# Patient Record
Sex: FEMALE | Race: WHITE | NOT HISPANIC OR LATINO | URBAN - METROPOLITAN AREA
[De-identification: names, ages, dates, MRNs, and addresses within clinical notes are randomized per-mention and may not be internally consistent; named-entity substitution may affect disease eponyms.]

---

## 2017-12-26 ENCOUNTER — EMERGENCY (EMERGENCY)
Facility: HOSPITAL | Age: 37
LOS: 1 days | Discharge: HOME | End: 2017-12-26
Attending: EMERGENCY MEDICINE
Payer: MEDICAID

## 2017-12-26 VITALS
DIASTOLIC BLOOD PRESSURE: 62 MMHG | RESPIRATION RATE: 18 BRPM | HEART RATE: 81 BPM | OXYGEN SATURATION: 99 % | TEMPERATURE: 98 F | SYSTOLIC BLOOD PRESSURE: 110 MMHG

## 2017-12-26 VITALS
OXYGEN SATURATION: 98 % | TEMPERATURE: 98 F | SYSTOLIC BLOOD PRESSURE: 107 MMHG | WEIGHT: 138.89 LBS | HEART RATE: 84 BPM | RESPIRATION RATE: 18 BRPM | DIASTOLIC BLOOD PRESSURE: 59 MMHG

## 2017-12-26 LAB
ALBUMIN SERPL ELPH-MCNC: 3.7 G/DL — SIGNIFICANT CHANGE UP (ref 3.5–5)
ALP SERPL-CCNC: 43 U/L — SIGNIFICANT CHANGE UP (ref 40–120)
ALT FLD-CCNC: 19 U/L DA — SIGNIFICANT CHANGE UP (ref 10–60)
ANION GAP SERPL CALC-SCNC: 7 MMOL/L — SIGNIFICANT CHANGE UP (ref 5–17)
APPEARANCE UR: CLEAR — SIGNIFICANT CHANGE UP
AST SERPL-CCNC: 10 U/L — SIGNIFICANT CHANGE UP (ref 10–40)
BASOPHILS # BLD AUTO: 0 K/UL — SIGNIFICANT CHANGE UP (ref 0–0.2)
BASOPHILS NFR BLD AUTO: 0.5 % — SIGNIFICANT CHANGE UP (ref 0–2)
BILIRUB SERPL-MCNC: 0.7 MG/DL — SIGNIFICANT CHANGE UP (ref 0.2–1.2)
BILIRUB UR-MCNC: NEGATIVE — SIGNIFICANT CHANGE UP
BUN SERPL-MCNC: 14 MG/DL — SIGNIFICANT CHANGE UP (ref 7–18)
CALCIUM SERPL-MCNC: 8.8 MG/DL — SIGNIFICANT CHANGE UP (ref 8.4–10.5)
CHLORIDE SERPL-SCNC: 108 MMOL/L — SIGNIFICANT CHANGE UP (ref 96–108)
CO2 SERPL-SCNC: 24 MMOL/L — SIGNIFICANT CHANGE UP (ref 22–31)
COLOR SPEC: YELLOW — SIGNIFICANT CHANGE UP
CREAT SERPL-MCNC: 0.46 MG/DL — LOW (ref 0.5–1.3)
DIFF PNL FLD: ABNORMAL
EOSINOPHIL # BLD AUTO: 0 K/UL — SIGNIFICANT CHANGE UP (ref 0–0.5)
EOSINOPHIL NFR BLD AUTO: 0.5 % — SIGNIFICANT CHANGE UP (ref 0–6)
GLUCOSE SERPL-MCNC: 132 MG/DL — HIGH (ref 70–99)
GLUCOSE UR QL: NEGATIVE — SIGNIFICANT CHANGE UP
HCT VFR BLD CALC: 40.8 % — SIGNIFICANT CHANGE UP (ref 34.5–45)
HGB BLD-MCNC: 13.6 G/DL — SIGNIFICANT CHANGE UP (ref 11.5–15.5)
KETONES UR-MCNC: NEGATIVE — SIGNIFICANT CHANGE UP
LEUKOCYTE ESTERASE UR-ACNC: ABNORMAL
LYMPHOCYTES # BLD AUTO: 1 K/UL — SIGNIFICANT CHANGE UP (ref 1–3.3)
LYMPHOCYTES # BLD AUTO: 10.5 % — LOW (ref 13–44)
MCHC RBC-ENTMCNC: 31 PG — SIGNIFICANT CHANGE UP (ref 27–34)
MCHC RBC-ENTMCNC: 33.4 GM/DL — SIGNIFICANT CHANGE UP (ref 32–36)
MCV RBC AUTO: 92.8 FL — SIGNIFICANT CHANGE UP (ref 80–100)
MONOCYTES # BLD AUTO: 0.3 K/UL — SIGNIFICANT CHANGE UP (ref 0–0.9)
MONOCYTES NFR BLD AUTO: 3.6 % — SIGNIFICANT CHANGE UP (ref 2–14)
NEUTROPHILS # BLD AUTO: 7.9 K/UL — HIGH (ref 1.8–7.4)
NEUTROPHILS NFR BLD AUTO: 84.9 % — HIGH (ref 43–77)
NITRITE UR-MCNC: NEGATIVE — SIGNIFICANT CHANGE UP
PH UR: 6 — SIGNIFICANT CHANGE UP (ref 5–8)
PLATELET # BLD AUTO: 187 K/UL — SIGNIFICANT CHANGE UP (ref 150–400)
POTASSIUM SERPL-MCNC: 4.3 MMOL/L — SIGNIFICANT CHANGE UP (ref 3.5–5.3)
POTASSIUM SERPL-SCNC: 4.3 MMOL/L — SIGNIFICANT CHANGE UP (ref 3.5–5.3)
PROT SERPL-MCNC: 7.5 G/DL — SIGNIFICANT CHANGE UP (ref 6–8.3)
PROT UR-MCNC: NEGATIVE — SIGNIFICANT CHANGE UP
RBC # BLD: 4.39 M/UL — SIGNIFICANT CHANGE UP (ref 3.8–5.2)
RBC # FLD: 12 % — SIGNIFICANT CHANGE UP (ref 10.3–14.5)
SODIUM SERPL-SCNC: 139 MMOL/L — SIGNIFICANT CHANGE UP (ref 135–145)
SP GR SPEC: 1.01 — SIGNIFICANT CHANGE UP (ref 1.01–1.02)
UROBILINOGEN FLD QL: NEGATIVE — SIGNIFICANT CHANGE UP
WBC # BLD: 9.3 K/UL — SIGNIFICANT CHANGE UP (ref 3.8–10.5)
WBC # FLD AUTO: 9.3 K/UL — SIGNIFICANT CHANGE UP (ref 3.8–10.5)

## 2017-12-26 PROCEDURE — 76817 TRANSVAGINAL US OBSTETRIC: CPT

## 2017-12-26 PROCEDURE — 84702 CHORIONIC GONADOTROPIN TEST: CPT

## 2017-12-26 PROCEDURE — 81001 URINALYSIS AUTO W/SCOPE: CPT

## 2017-12-26 PROCEDURE — 96375 TX/PRO/DX INJ NEW DRUG ADDON: CPT

## 2017-12-26 PROCEDURE — 76817 TRANSVAGINAL US OBSTETRIC: CPT | Mod: 26

## 2017-12-26 PROCEDURE — 85027 COMPLETE CBC AUTOMATED: CPT

## 2017-12-26 PROCEDURE — 96365 THER/PROPH/DIAG IV INF INIT: CPT

## 2017-12-26 PROCEDURE — 99284 EMERGENCY DEPT VISIT MOD MDM: CPT

## 2017-12-26 PROCEDURE — 80053 COMPREHEN METABOLIC PANEL: CPT

## 2017-12-26 PROCEDURE — 76801 OB US < 14 WKS SINGLE FETUS: CPT | Mod: 26

## 2017-12-26 PROCEDURE — 76801 OB US < 14 WKS SINGLE FETUS: CPT

## 2017-12-26 PROCEDURE — 99284 EMERGENCY DEPT VISIT MOD MDM: CPT | Mod: 25

## 2017-12-26 RX ORDER — ACETAMINOPHEN 500 MG
1000 TABLET ORAL ONCE
Qty: 0 | Refills: 0 | Status: COMPLETED | OUTPATIENT
Start: 2017-12-26 | End: 2017-12-26

## 2017-12-26 RX ORDER — SODIUM CHLORIDE 9 MG/ML
1000 INJECTION INTRAMUSCULAR; INTRAVENOUS; SUBCUTANEOUS ONCE
Qty: 0 | Refills: 0 | Status: COMPLETED | OUTPATIENT
Start: 2017-12-26 | End: 2017-12-26

## 2017-12-26 RX ORDER — ONDANSETRON 8 MG/1
1 TABLET, FILM COATED ORAL
Qty: 15 | Refills: 0 | OUTPATIENT
Start: 2017-12-26 | End: 2017-12-30

## 2017-12-26 RX ORDER — DOCUSATE SODIUM 100 MG
1 CAPSULE ORAL
Qty: 20 | Refills: 0 | OUTPATIENT
Start: 2017-12-26 | End: 2018-01-04

## 2017-12-26 RX ORDER — POLYETHYLENE GLYCOL 3350 17 G/17G
17 POWDER, FOR SOLUTION ORAL ONCE
Qty: 0 | Refills: 0 | Status: COMPLETED | OUTPATIENT
Start: 2017-12-26 | End: 2017-12-26

## 2017-12-26 RX ORDER — ONDANSETRON 8 MG/1
4 TABLET, FILM COATED ORAL ONCE
Qty: 0 | Refills: 0 | Status: COMPLETED | OUTPATIENT
Start: 2017-12-26 | End: 2017-12-26

## 2017-12-26 RX ADMIN — Medication 1 ENEMA: at 20:17

## 2017-12-26 RX ADMIN — POLYETHYLENE GLYCOL 3350 17 GRAM(S): 17 POWDER, FOR SOLUTION ORAL at 18:23

## 2017-12-26 RX ADMIN — ONDANSETRON 4 MILLIGRAM(S): 8 TABLET, FILM COATED ORAL at 16:57

## 2017-12-26 RX ADMIN — SODIUM CHLORIDE 1000 MILLILITER(S): 9 INJECTION INTRAMUSCULAR; INTRAVENOUS; SUBCUTANEOUS at 16:57

## 2017-12-26 RX ADMIN — SODIUM CHLORIDE 1000 MILLILITER(S): 9 INJECTION INTRAMUSCULAR; INTRAVENOUS; SUBCUTANEOUS at 18:23

## 2017-12-26 RX ADMIN — Medication 400 MILLIGRAM(S): at 17:01

## 2017-12-26 RX ADMIN — Medication 1000 MILLIGRAM(S): at 20:15

## 2017-12-26 NOTE — ED PROVIDER NOTE - CHPI ED SYMPTOMS NEG
no fever/no chills/no chest pain, no difficulty breathing, no vaginal bleeding, no vaginal discharge/no dysuria

## 2017-12-26 NOTE — ED PROVIDER NOTE - PHYSICAL EXAMINATION
RECTAL: After initial rectal exam, when enema was pulled out, pt experienced pain and refused further manual disimpaction, stating she prefers oral laxatives.  GENITOURINARY: Bedside sonogram reveals intrauterine gestation with heart rate of 185.

## 2017-12-26 NOTE — ED PROVIDER NOTE - MEDICAL DECISION MAKING DETAILS
Rectal pain and tenderness in the setting of known constipation and decreased PO intake secondary to dehydration and pregnancy. Will r/o ruptured ovarian cyst versus ectopic pregnancy with bedside US, give Miralax and fluids for symptomatic treatment, and reassess. Rectal pain and tenderness in the setting of known constipation and decreased PO intake secondary to dehydration and pregnancy. Will r/o ruptured ovarian cyst versus ectopic pregnancy w/transvag US. give Miralax and fluids, zofran, tylenol for symptom relief. Rectal pain and tenderness in the setting of known constipation and decreased PO intake secondary to dehydration and pregnancy. Will r/o ruptured ovarian cyst versus ectopic pregnancy w/transvag US. give Miralax and fluids, zofran, tylenol for symptom relief.    Attending: agree with above

## 2017-12-26 NOTE — ED PROVIDER NOTE - CARE PLAN
Instructions for follow-up, activity and diet:	The patient was given verbal and written discharge instructions. Specifically, instructions when to return to the ED and when to seek follow-up from their pcp was discussed. Any specialty follow-up was discussed, including how to make an appointment.  Instructions were discussed in simple, plain language and was understood by the patient. The patient understands that should their symptoms worsen or any new symptoms arise, they should return to the ED immediately for further evaluation. Principal Discharge DX:	Constipation  Instructions for follow-up, activity and diet:	The patient was given verbal and written discharge instructions. Specifically, instructions when to return to the ED and when to seek follow-up from their pcp was discussed. Any specialty follow-up was discussed, including how to make an appointment.  Instructions were discussed in simple, plain language and was understood by the patient. The patient understands that should their symptoms worsen or any new symptoms arise, they should return to the ED immediately for further evaluation.

## 2017-12-26 NOTE — ED PROVIDER NOTE - OBJECTIVE STATEMENT
35 y/o F pt (; currently x11 weeks pregnant with an US confirmed IUP) with no PMHx and no PSHx presents to ED c/o progressively worsening diffuse abd pain with associated nausea and constipation (no BM) x1 week. Pt also reports difficulty tolerating PO secondary to nausea and abd pain. Pt states she hasn't been drinking any water for several weeks because it has been causing "stomach discomfort". Pt reports taking multiple enemas with no relief of constipation. Per pt, pt has only been taking folic acid with her pregnancy, and has not taken any iron supplements. Pt denies fever, chills, dysuria, CP, difficulty breathing, vaginal bleeding, vaginal discharge, or any other complaints. Pt also denies pregnancy being medically induced. NKDA. 35 y/o F pt (; currently x11 weeks pregnant with an US confirmed IUP) presents to ED c/o progressively worsening diffuse abd pain with associated nausea and constipation (no BM) x1 week. Pt also reports difficulty tolerating PO secondary to nausea and abd pain. Pt states she hasn't been drinking enough water for several weeks because it has been causing stomach pain. Pt reports taking multiple enemas with no relief of constipation. Per pt, pt has only been taking folic acid with her pregnancy, and has not taken any iron supplements. Pt denies fever, chills, dysuria, CP, difficulty breathing, vaginal bleeding, vaginal discharge, or any other complaints. Pt also denies pregnancy being medically induced. NKDA. 37 y/o F pt (; currently x11 weeks pregnant with an US confirmed IUP) presents to ED c/o progressively worsening diffuse abd pain with associated nausea and constipation (no BM) x1 week. Pt also reports difficulty tolerating PO secondary to nausea and abd pain. Pt states she hasn't been drinking enough water for several weeks because it has been causing stomach pain. Pt reports taking multiple enemas with no relief of constipation. Per pt, pt has only been taking folic acid with her pregnancy, and has not taken any iron supplements. Pt denies fever, chills, dysuria, CP, difficulty breathing, vaginal bleeding, vaginal discharge, or any other complaints. Pt also denies pregnancy being medically induced. NKDA.    Attending: as above, in addition patient tried enema but only used 1/4 bottle at a time as she was unsure of how to perform enema with no relief.

## 2017-12-26 NOTE — ED PROVIDER NOTE - ATTENDING CONTRIBUTION TO CARE
Dr. Smallwood: I have personally performed a face to face bedside history and physical examination of this patient. I have discussed the history, examination, review of systems, assessment and plan of management with the resident. I have reviewed the electronic medical record and amended it to reflect my history, review of systems, physical exam, assessment and plan.    Attending Exam - Dr. Smallwood: GEN: well appearing, NAD  HEENT: +PERRL, EOMI  RESP: CTAB, no signs of respiratory distress CV: s1s2 RRR ABD: soft/non tender/non distended  MSK: no deformities / swelling, normal range of motion, spine grossly normal NEURO: alert, non focal exam SKIN: normal color / temperature / condition.

## 2017-12-26 NOTE — ED PROVIDER NOTE - MUSCULOSKELETAL, MLM
Spine appears normal, range of motion is not limited, no muscle or joint tenderness. Extremities warm and well-perfused.

## 2018-07-04 ENCOUNTER — OUTPATIENT (OUTPATIENT)
Dept: OUTPATIENT SERVICES | Facility: HOSPITAL | Age: 38
LOS: 1 days | End: 2018-07-04

## 2018-07-04 ENCOUNTER — EMERGENCY (EMERGENCY)
Facility: HOSPITAL | Age: 38
LOS: 1 days | Discharge: STILL ADMITTED OR ADMINISTRATIVE | End: 2018-07-04
Admitting: EMERGENCY MEDICINE

## 2018-07-04 VITALS
HEART RATE: 78 BPM | SYSTOLIC BLOOD PRESSURE: 110 MMHG | DIASTOLIC BLOOD PRESSURE: 58 MMHG | OXYGEN SATURATION: 100 % | RESPIRATION RATE: 18 BRPM | TEMPERATURE: 98 F

## 2018-07-04 DIAGNOSIS — O26.899 OTHER SPECIFIED PREGNANCY RELATED CONDITIONS, UNSPECIFIED TRIMESTER: ICD-10-CM

## 2018-07-04 DIAGNOSIS — Z3A.00 WEEKS OF GESTATION OF PREGNANCY NOT SPECIFIED: ICD-10-CM

## 2018-07-04 LAB
BASOPHILS # BLD AUTO: 0.03 K/UL — SIGNIFICANT CHANGE UP (ref 0–0.2)
BASOPHILS NFR BLD AUTO: 0.5 % — SIGNIFICANT CHANGE UP (ref 0–2)
BLD GP AB SCN SERPL QL: NEGATIVE — SIGNIFICANT CHANGE UP
EOSINOPHIL # BLD AUTO: 0.07 K/UL — SIGNIFICANT CHANGE UP (ref 0–0.5)
EOSINOPHIL NFR BLD AUTO: 1.3 % — SIGNIFICANT CHANGE UP (ref 0–6)
HCT VFR BLD CALC: 34.7 % — SIGNIFICANT CHANGE UP (ref 34.5–45)
HGB BLD-MCNC: 11 G/DL — LOW (ref 11.5–15.5)
IMM GRANULOCYTES # BLD AUTO: 0.02 # — SIGNIFICANT CHANGE UP
IMM GRANULOCYTES NFR BLD AUTO: 0.4 % — SIGNIFICANT CHANGE UP (ref 0–1.5)
LYMPHOCYTES # BLD AUTO: 1.07 K/UL — SIGNIFICANT CHANGE UP (ref 1–3.3)
LYMPHOCYTES # BLD AUTO: 19.4 % — SIGNIFICANT CHANGE UP (ref 13–44)
MCHC RBC-ENTMCNC: 28.1 PG — SIGNIFICANT CHANGE UP (ref 27–34)
MCHC RBC-ENTMCNC: 31.7 % — LOW (ref 32–36)
MCV RBC AUTO: 88.7 FL — SIGNIFICANT CHANGE UP (ref 80–100)
MONOCYTES # BLD AUTO: 0.33 K/UL — SIGNIFICANT CHANGE UP (ref 0–0.9)
MONOCYTES NFR BLD AUTO: 6 % — SIGNIFICANT CHANGE UP (ref 2–14)
NEUTROPHILS # BLD AUTO: 4 K/UL — SIGNIFICANT CHANGE UP (ref 1.8–7.4)
NEUTROPHILS NFR BLD AUTO: 72.4 % — SIGNIFICANT CHANGE UP (ref 43–77)
NRBC # FLD: 0 — SIGNIFICANT CHANGE UP
PLATELET # BLD AUTO: 167 K/UL — SIGNIFICANT CHANGE UP (ref 150–400)
PMV BLD: 11.2 FL — SIGNIFICANT CHANGE UP (ref 7–13)
RBC # BLD: 3.91 M/UL — SIGNIFICANT CHANGE UP (ref 3.8–5.2)
RBC # FLD: 13.4 % — SIGNIFICANT CHANGE UP (ref 10.3–14.5)
RH IG SCN BLD-IMP: POSITIVE — SIGNIFICANT CHANGE UP
WBC # BLD: 5.52 K/UL — SIGNIFICANT CHANGE UP (ref 3.8–10.5)
WBC # FLD AUTO: 5.52 K/UL — SIGNIFICANT CHANGE UP (ref 3.8–10.5)

## 2018-07-04 RX ORDER — SODIUM CHLORIDE 9 MG/ML
1000 INJECTION, SOLUTION INTRAVENOUS ONCE
Qty: 0 | Refills: 0 | Status: DISCONTINUED | OUTPATIENT
Start: 2018-07-04 | End: 2018-07-19

## 2018-07-04 RX ORDER — SODIUM CHLORIDE 9 MG/ML
1000 INJECTION, SOLUTION INTRAVENOUS
Qty: 0 | Refills: 0 | Status: DISCONTINUED | OUTPATIENT
Start: 2018-07-04 | End: 2018-07-19

## 2018-07-04 NOTE — ED ADULT TRIAGE NOTE - CHIEF COMPLAINT QUOTE
pt states she is due July 20th, Is having lower abdominal pain that last 2 hours at a time. does not feel like contractions, denies bloody show or water breaking.

## 2018-07-05 LAB — T PALLIDUM AB TITR SER: NEGATIVE — SIGNIFICANT CHANGE UP

## 2018-07-11 ENCOUNTER — OUTPATIENT (OUTPATIENT)
Dept: OUTPATIENT SERVICES | Facility: HOSPITAL | Age: 38
LOS: 1 days | End: 2018-07-11
Payer: MEDICAID

## 2018-07-11 DIAGNOSIS — Z3A.00 WEEKS OF GESTATION OF PREGNANCY NOT SPECIFIED: ICD-10-CM

## 2018-07-11 DIAGNOSIS — O26.899 OTHER SPECIFIED PREGNANCY RELATED CONDITIONS, UNSPECIFIED TRIMESTER: ICD-10-CM

## 2018-07-11 PROCEDURE — 76819 FETAL BIOPHYS PROFIL W/O NST: CPT

## 2018-07-11 PROCEDURE — 76815 OB US LIMITED FETUS(S): CPT | Mod: 26

## 2018-07-11 PROCEDURE — 99283 EMERGENCY DEPT VISIT LOW MDM: CPT

## 2018-07-11 PROCEDURE — 76819 FETAL BIOPHYS PROFIL W/O NST: CPT | Mod: 26

## 2018-07-11 PROCEDURE — 59025 FETAL NON-STRESS TEST: CPT

## 2018-07-11 PROCEDURE — 76815 OB US LIMITED FETUS(S): CPT

## 2018-07-11 PROCEDURE — G0463: CPT

## 2018-07-26 ENCOUNTER — INPATIENT (INPATIENT)
Facility: HOSPITAL | Age: 38
LOS: 1 days | Discharge: HOME | End: 2018-07-28
Attending: OBSTETRICS & GYNECOLOGY | Admitting: OBSTETRICS & GYNECOLOGY
Payer: MEDICAID

## 2018-07-26 VITALS — WEIGHT: 174.17 LBS

## 2018-07-26 DIAGNOSIS — Z3A.00 WEEKS OF GESTATION OF PREGNANCY NOT SPECIFIED: ICD-10-CM

## 2018-07-26 DIAGNOSIS — O26.899 OTHER SPECIFIED PREGNANCY RELATED CONDITIONS, UNSPECIFIED TRIMESTER: ICD-10-CM

## 2018-07-26 DIAGNOSIS — Z34.80 ENCOUNTER FOR SUPERVISION OF OTHER NORMAL PREGNANCY, UNSPECIFIED TRIMESTER: ICD-10-CM

## 2018-07-26 LAB
BASOPHILS # BLD AUTO: 0 K/UL — SIGNIFICANT CHANGE UP (ref 0–0.2)
BASOPHILS NFR BLD AUTO: 0.4 % — SIGNIFICANT CHANGE UP (ref 0–2)
BLD GP AB SCN SERPL QL: NEGATIVE — SIGNIFICANT CHANGE UP
EOSINOPHIL # BLD AUTO: 0.1 K/UL — SIGNIFICANT CHANGE UP (ref 0–0.5)
EOSINOPHIL NFR BLD AUTO: 2.3 % — SIGNIFICANT CHANGE UP (ref 0–6)
HBV SURFACE AG SERPL QL IA: SIGNIFICANT CHANGE UP
HCT VFR BLD CALC: 39.8 % — SIGNIFICANT CHANGE UP (ref 34.5–45)
HGB BLD-MCNC: 12.3 G/DL — SIGNIFICANT CHANGE UP (ref 11.5–15.5)
HIV 1 & 2 AB SERPL IA.RAPID: SIGNIFICANT CHANGE UP
LYMPHOCYTES # BLD AUTO: 1.2 K/UL — SIGNIFICANT CHANGE UP (ref 1–3.3)
LYMPHOCYTES # BLD AUTO: 21.3 % — SIGNIFICANT CHANGE UP (ref 13–44)
MCHC RBC-ENTMCNC: 26.9 PG — LOW (ref 27–34)
MCHC RBC-ENTMCNC: 30.8 GM/DL — LOW (ref 32–36)
MCV RBC AUTO: 87.2 FL — SIGNIFICANT CHANGE UP (ref 80–100)
MONOCYTES # BLD AUTO: 0.3 K/UL — SIGNIFICANT CHANGE UP (ref 0–0.9)
MONOCYTES NFR BLD AUTO: 5.8 % — SIGNIFICANT CHANGE UP (ref 2–14)
NEUTROPHILS # BLD AUTO: 3.9 K/UL — SIGNIFICANT CHANGE UP (ref 1.8–7.4)
NEUTROPHILS NFR BLD AUTO: 70.2 % — SIGNIFICANT CHANGE UP (ref 43–77)
PLATELET # BLD AUTO: 156 K/UL — SIGNIFICANT CHANGE UP (ref 150–400)
RBC # BLD: 4.57 M/UL — SIGNIFICANT CHANGE UP (ref 3.8–5.2)
RBC # FLD: 14.2 % — SIGNIFICANT CHANGE UP (ref 10.3–14.5)
RH IG SCN BLD-IMP: POSITIVE — SIGNIFICANT CHANGE UP
RUBV IGG SER-ACNC: 4.1 INDEX — SIGNIFICANT CHANGE UP
RUBV IGG SER-IMP: POSITIVE — SIGNIFICANT CHANGE UP
T PALLIDUM AB TITR SER: NEGATIVE — SIGNIFICANT CHANGE UP
WBC # BLD: 5.6 K/UL — SIGNIFICANT CHANGE UP (ref 3.8–10.5)
WBC # FLD AUTO: 5.6 K/UL — SIGNIFICANT CHANGE UP (ref 3.8–10.5)

## 2018-07-26 PROCEDURE — 59409 OBSTETRICAL CARE: CPT | Mod: U9

## 2018-07-26 RX ORDER — DIPHENOXYLATE HCL/ATROPINE 2.5-.025MG
1 TABLET ORAL ONCE
Qty: 0 | Refills: 0 | Status: DISCONTINUED | OUTPATIENT
Start: 2018-07-26 | End: 2018-07-26

## 2018-07-26 RX ORDER — AER TRAVELER 0.5 G/1
1 SOLUTION RECTAL; TOPICAL EVERY 4 HOURS
Qty: 0 | Refills: 0 | Status: DISCONTINUED | OUTPATIENT
Start: 2018-07-26 | End: 2018-07-26

## 2018-07-26 RX ORDER — DIBUCAINE 1 %
1 OINTMENT (GRAM) RECTAL EVERY 4 HOURS
Qty: 0 | Refills: 0 | Status: DISCONTINUED | OUTPATIENT
Start: 2018-07-27 | End: 2018-07-28

## 2018-07-26 RX ORDER — AER TRAVELER 0.5 G/1
1 SOLUTION RECTAL; TOPICAL EVERY 4 HOURS
Qty: 0 | Refills: 0 | Status: DISCONTINUED | OUTPATIENT
Start: 2018-07-27 | End: 2018-07-28

## 2018-07-26 RX ORDER — OXYTOCIN 10 UNIT/ML
333.33 VIAL (ML) INJECTION
Qty: 20 | Refills: 0 | Status: COMPLETED | OUTPATIENT
Start: 2018-07-26

## 2018-07-26 RX ORDER — SODIUM CHLORIDE 9 MG/ML
1000 INJECTION, SOLUTION INTRAVENOUS ONCE
Qty: 0 | Refills: 0 | Status: DISCONTINUED | OUTPATIENT
Start: 2018-07-26 | End: 2018-07-26

## 2018-07-26 RX ORDER — DIPHENHYDRAMINE HCL 50 MG
25 CAPSULE ORAL EVERY 6 HOURS
Qty: 0 | Refills: 0 | Status: DISCONTINUED | OUTPATIENT
Start: 2018-07-27 | End: 2018-07-28

## 2018-07-26 RX ORDER — ONDANSETRON 8 MG/1
4 TABLET, FILM COATED ORAL ONCE
Qty: 0 | Refills: 0 | Status: DISCONTINUED | OUTPATIENT
Start: 2018-07-26 | End: 2018-07-28

## 2018-07-26 RX ORDER — DOCUSATE SODIUM 100 MG
100 CAPSULE ORAL
Qty: 0 | Refills: 0 | Status: DISCONTINUED | OUTPATIENT
Start: 2018-07-27 | End: 2018-07-28

## 2018-07-26 RX ORDER — PRAMOXINE HYDROCHLORIDE 150 MG/15G
1 AEROSOL, FOAM RECTAL EVERY 4 HOURS
Qty: 0 | Refills: 0 | Status: DISCONTINUED | OUTPATIENT
Start: 2018-07-27 | End: 2018-07-28

## 2018-07-26 RX ORDER — CARBOPROST TROMETHAMINE 250 UG/ML
250 INJECTION, SOLUTION INTRAMUSCULAR ONCE
Qty: 0 | Refills: 0 | Status: COMPLETED | OUTPATIENT
Start: 2018-07-26 | End: 2018-07-26

## 2018-07-26 RX ORDER — HYDROCORTISONE 1 %
1 OINTMENT (GRAM) TOPICAL EVERY 4 HOURS
Qty: 0 | Refills: 0 | Status: DISCONTINUED | OUTPATIENT
Start: 2018-07-27 | End: 2018-07-28

## 2018-07-26 RX ORDER — SODIUM CHLORIDE 9 MG/ML
3 INJECTION INTRAMUSCULAR; INTRAVENOUS; SUBCUTANEOUS EVERY 8 HOURS
Qty: 0 | Refills: 0 | Status: DISCONTINUED | OUTPATIENT
Start: 2018-07-26 | End: 2018-07-26

## 2018-07-26 RX ORDER — OXYTOCIN 10 UNIT/ML
333.33 VIAL (ML) INJECTION
Qty: 20 | Refills: 0 | Status: COMPLETED | OUTPATIENT
Start: 2018-07-26 | End: 2018-07-26

## 2018-07-26 RX ORDER — HYDROCORTISONE 1 %
1 OINTMENT (GRAM) TOPICAL EVERY 4 HOURS
Qty: 0 | Refills: 0 | Status: DISCONTINUED | OUTPATIENT
Start: 2018-07-26 | End: 2018-07-26

## 2018-07-26 RX ORDER — LANOLIN
1 OINTMENT (GRAM) TOPICAL EVERY 6 HOURS
Qty: 0 | Refills: 0 | Status: DISCONTINUED | OUTPATIENT
Start: 2018-07-27 | End: 2018-07-28

## 2018-07-26 RX ORDER — PRAMOXINE HYDROCHLORIDE 150 MG/15G
1 AEROSOL, FOAM RECTAL EVERY 4 HOURS
Qty: 0 | Refills: 0 | Status: DISCONTINUED | OUTPATIENT
Start: 2018-07-26 | End: 2018-07-26

## 2018-07-26 RX ORDER — SODIUM CHLORIDE 9 MG/ML
1000 INJECTION, SOLUTION INTRAVENOUS
Qty: 0 | Refills: 0 | Status: DISCONTINUED | OUTPATIENT
Start: 2018-07-26 | End: 2018-07-26

## 2018-07-26 RX ORDER — GLYCERIN ADULT
1 SUPPOSITORY, RECTAL RECTAL AT BEDTIME
Qty: 0 | Refills: 0 | Status: DISCONTINUED | OUTPATIENT
Start: 2018-07-27 | End: 2018-07-28

## 2018-07-26 RX ORDER — MAGNESIUM HYDROXIDE 400 MG/1
30 TABLET, CHEWABLE ORAL
Qty: 0 | Refills: 0 | Status: DISCONTINUED | OUTPATIENT
Start: 2018-07-27 | End: 2018-07-28

## 2018-07-26 RX ORDER — SIMETHICONE 80 MG/1
80 TABLET, CHEWABLE ORAL EVERY 6 HOURS
Qty: 0 | Refills: 0 | Status: DISCONTINUED | OUTPATIENT
Start: 2018-07-27 | End: 2018-07-28

## 2018-07-26 RX ORDER — SODIUM CHLORIDE 9 MG/ML
1000 INJECTION, SOLUTION INTRAVENOUS ONCE
Qty: 0 | Refills: 0 | Status: DISCONTINUED | OUTPATIENT
Start: 2018-07-26 | End: 2018-07-28

## 2018-07-26 RX ORDER — DIBUCAINE 1 %
1 OINTMENT (GRAM) RECTAL EVERY 4 HOURS
Qty: 0 | Refills: 0 | Status: DISCONTINUED | OUTPATIENT
Start: 2018-07-26 | End: 2018-07-26

## 2018-07-26 RX ORDER — CITRIC ACID/SODIUM CITRATE 300-500 MG
15 SOLUTION, ORAL ORAL EVERY 4 HOURS
Qty: 0 | Refills: 0 | Status: DISCONTINUED | OUTPATIENT
Start: 2018-07-26 | End: 2018-07-26

## 2018-07-26 RX ORDER — OXYTOCIN 10 UNIT/ML
41.67 VIAL (ML) INJECTION
Qty: 20 | Refills: 0 | Status: DISCONTINUED | OUTPATIENT
Start: 2018-07-26 | End: 2018-07-26

## 2018-07-26 RX ORDER — TETANUS TOXOID, REDUCED DIPHTHERIA TOXOID AND ACELLULAR PERTUSSIS VACCINE, ADSORBED 5; 2.5; 8; 8; 2.5 [IU]/.5ML; [IU]/.5ML; UG/.5ML; UG/.5ML; UG/.5ML
0.5 SUSPENSION INTRAMUSCULAR ONCE
Qty: 0 | Refills: 0 | Status: COMPLETED | OUTPATIENT
Start: 2018-07-28

## 2018-07-26 RX ORDER — OXYTOCIN 10 UNIT/ML
4 VIAL (ML) INJECTION
Qty: 30 | Refills: 0 | Status: DISCONTINUED | OUTPATIENT
Start: 2018-07-26 | End: 2018-07-27

## 2018-07-26 RX ADMIN — Medication 1 TABLET(S): at 20:46

## 2018-07-26 RX ADMIN — Medication 1000 MILLIUNIT(S)/MIN: at 19:35

## 2018-07-26 RX ADMIN — CARBOPROST TROMETHAMINE 250 MICROGRAM(S): 250 INJECTION, SOLUTION INTRAMUSCULAR at 19:59

## 2018-07-26 NOTE — CHART NOTE - NSCHARTNOTEFT_GEN_A_CORE
Patient is 37y  s/p  with PPH 600cc, received hemabate/cytotec, called to bathroom in PACU for pt vomiting and feeling unwell. Pt states that she feels weak with copious diarrhea and emesis. Pt pale appearing and diaphoretic.       VS on exam   HR  /80    Vital Signs Last 24 Hrs  T(C): 36.9 (2018 20:25), Max: 36.9 (2018 20:25)  T(F): 98.4 (2018 20:25), Max: 98.4 (2018 20:25)  HR: 64 (2018 22:40) (64 - 76)  BP: 104/57 (2018 22:40) (103/55 - 122/63)  BP(mean): 77 (2018 22:40) (72 - 85)  RR: 18 (2018 21:25) (16 - 18)  SpO2: 99% (2018 22:40) (96% - 100%)    I&O's Detail    2018 07:01  -  2018 23:34  --------------------------------------------------------  IN:    Lactated Ringers IV Bolus: 1000 mL    lactated ringers.: 560.4 mL    oxytocin Infusion: 4.4 mL  Total IN: 1564.7 mL    OUT:    Estimated Blood Loss: 600 mL    Indwelling Catheter - Urethral: 50 mL  Total OUT: 650 mL    Total NET: 914.7 mL          PE:  Gen: pale, diaphoretic  CV: RR s1s2  Abd: fundus firm   Ext: NT b/l  VE: no bleeding     Labs:                        12.3   5.6   )-----------( 156      ( 2018 08:55 )             39.8           Fibrinogen:     Lactate:     MEDICATIONS  (STANDING):  misoprostol 25 MICROGram(s) Vaginal once  ondansetron Injectable 4 milliGRAM(s) IV Push once  oxytocin Infusion 4 milliUNIT(s)/Min (4 mL/Hr) IV Continuous <Continuous>      Assessment: 38 yo s/p vasovagal episode in PACU, known PPH, not currently bleeding     Differential Dx : Likely vasovagal episode, less likely anemia, infection, electrolyte abnormalities    Plan:  VS WNL   Will send CBC CMP Lactate  IVF bolus  Bedrest for now   Continue to monitor VS closely    Lscanlon R3             -------------------------------------------------------------------------------------------------------------

## 2018-07-27 ENCOUNTER — TRANSCRIPTION ENCOUNTER (OUTPATIENT)
Age: 38
End: 2018-07-27

## 2018-07-27 LAB
ALBUMIN SERPL ELPH-MCNC: 3.4 G/DL — SIGNIFICANT CHANGE UP (ref 3.3–5)
ALP SERPL-CCNC: 201 U/L — HIGH (ref 40–120)
ALT FLD-CCNC: 17 U/L — SIGNIFICANT CHANGE UP (ref 10–45)
ANION GAP SERPL CALC-SCNC: 16 MMOL/L — SIGNIFICANT CHANGE UP (ref 5–17)
APTT BLD: 23.2 SEC — LOW (ref 27.5–37.4)
AST SERPL-CCNC: 24 U/L — SIGNIFICANT CHANGE UP (ref 10–40)
BASOPHILS # BLD AUTO: 0 K/UL — SIGNIFICANT CHANGE UP (ref 0–0.2)
BASOPHILS NFR BLD AUTO: 0.2 % — SIGNIFICANT CHANGE UP (ref 0–2)
BILIRUB SERPL-MCNC: 1 MG/DL — SIGNIFICANT CHANGE UP (ref 0.2–1.2)
BUN SERPL-MCNC: 9 MG/DL — SIGNIFICANT CHANGE UP (ref 7–23)
CALCIUM SERPL-MCNC: 9 MG/DL — SIGNIFICANT CHANGE UP (ref 8.4–10.5)
CHLORIDE SERPL-SCNC: 106 MMOL/L — SIGNIFICANT CHANGE UP (ref 96–108)
CO2 SERPL-SCNC: 18 MMOL/L — LOW (ref 22–31)
CREAT SERPL-MCNC: 0.44 MG/DL — LOW (ref 0.5–1.3)
EOSINOPHIL # BLD AUTO: 0 K/UL — SIGNIFICANT CHANGE UP (ref 0–0.5)
EOSINOPHIL NFR BLD AUTO: 0.3 % — SIGNIFICANT CHANGE UP (ref 0–6)
GLUCOSE SERPL-MCNC: 103 MG/DL — HIGH (ref 70–99)
HCT VFR BLD CALC: 30.6 % — LOW (ref 34.5–45)
HCT VFR BLD CALC: 37.3 % — SIGNIFICANT CHANGE UP (ref 34.5–45)
HGB BLD-MCNC: 10.2 G/DL — LOW (ref 11.5–15.5)
HGB BLD-MCNC: 12.5 G/DL — SIGNIFICANT CHANGE UP (ref 11.5–15.5)
INR BLD: 0.89 RATIO — SIGNIFICANT CHANGE UP (ref 0.88–1.16)
LACTATE SERPL-SCNC: 2.1 MMOL/L — HIGH (ref 0.7–2)
LACTATE SERPL-SCNC: 5.2 MMOL/L — CRITICAL HIGH (ref 0.7–2)
LYMPHOCYTES # BLD AUTO: 1 K/UL — SIGNIFICANT CHANGE UP (ref 1–3.3)
LYMPHOCYTES # BLD AUTO: 11.2 % — LOW (ref 13–44)
MCHC RBC-ENTMCNC: 28.9 PG — SIGNIFICANT CHANGE UP (ref 27–34)
MCHC RBC-ENTMCNC: 29.4 PG — SIGNIFICANT CHANGE UP (ref 27–34)
MCHC RBC-ENTMCNC: 33.3 GM/DL — SIGNIFICANT CHANGE UP (ref 32–36)
MCHC RBC-ENTMCNC: 33.6 GM/DL — SIGNIFICANT CHANGE UP (ref 32–36)
MCV RBC AUTO: 86.6 FL — SIGNIFICANT CHANGE UP (ref 80–100)
MCV RBC AUTO: 87.5 FL — SIGNIFICANT CHANGE UP (ref 80–100)
MONOCYTES # BLD AUTO: 0.4 K/UL — SIGNIFICANT CHANGE UP (ref 0–0.9)
MONOCYTES NFR BLD AUTO: 3.8 % — SIGNIFICANT CHANGE UP (ref 2–14)
NEUTROPHILS # BLD AUTO: 8 K/UL — HIGH (ref 1.8–7.4)
NEUTROPHILS NFR BLD AUTO: 84.5 % — HIGH (ref 43–77)
PLATELET # BLD AUTO: 148 K/UL — LOW (ref 150–400)
PLATELET # BLD AUTO: 205 K/UL — SIGNIFICANT CHANGE UP (ref 150–400)
POTASSIUM SERPL-MCNC: 3.7 MMOL/L — SIGNIFICANT CHANGE UP (ref 3.5–5.3)
POTASSIUM SERPL-SCNC: 3.7 MMOL/L — SIGNIFICANT CHANGE UP (ref 3.5–5.3)
PROT SERPL-MCNC: 6.3 G/DL — SIGNIFICANT CHANGE UP (ref 6–8.3)
PROTHROM AB SERPL-ACNC: 9.7 SEC — LOW (ref 9.8–12.7)
RBC # BLD: 3.53 M/UL — LOW (ref 3.8–5.2)
RBC # BLD: 4.26 M/UL — SIGNIFICANT CHANGE UP (ref 3.8–5.2)
RBC # FLD: 13.8 % — SIGNIFICANT CHANGE UP (ref 10.3–14.5)
RBC # FLD: 13.9 % — SIGNIFICANT CHANGE UP (ref 10.3–14.5)
SODIUM SERPL-SCNC: 140 MMOL/L — SIGNIFICANT CHANGE UP (ref 135–145)
WBC # BLD: 16.8 K/UL — HIGH (ref 3.8–10.5)
WBC # BLD: 9.4 K/UL — SIGNIFICANT CHANGE UP (ref 3.8–10.5)
WBC # FLD AUTO: 16.8 K/UL — HIGH (ref 3.8–10.5)
WBC # FLD AUTO: 9.4 K/UL — SIGNIFICANT CHANGE UP (ref 3.8–10.5)

## 2018-07-27 RX ORDER — KETOROLAC TROMETHAMINE 30 MG/ML
30 SYRINGE (ML) INJECTION ONCE
Qty: 0 | Refills: 0 | Status: DISCONTINUED | OUTPATIENT
Start: 2018-07-27 | End: 2018-07-28

## 2018-07-27 RX ORDER — OXYCODONE HYDROCHLORIDE 5 MG/1
5 TABLET ORAL EVERY 4 HOURS
Qty: 0 | Refills: 0 | Status: DISCONTINUED | OUTPATIENT
Start: 2018-07-27 | End: 2018-07-28

## 2018-07-27 RX ORDER — DOCUSATE SODIUM 100 MG
1 CAPSULE ORAL
Qty: 20 | Refills: 0 | OUTPATIENT
Start: 2018-07-27 | End: 2018-08-05

## 2018-07-27 RX ORDER — ACETAMINOPHEN 500 MG
975 TABLET ORAL EVERY 6 HOURS
Qty: 0 | Refills: 0 | Status: DISCONTINUED | OUTPATIENT
Start: 2018-07-27 | End: 2018-07-28

## 2018-07-27 RX ORDER — IBUPROFEN 200 MG
1 TABLET ORAL
Qty: 60 | Refills: 0 | OUTPATIENT
Start: 2018-07-27

## 2018-07-27 RX ORDER — IBUPROFEN 200 MG
600 TABLET ORAL EVERY 6 HOURS
Qty: 0 | Refills: 0 | Status: DISCONTINUED | OUTPATIENT
Start: 2018-07-27 | End: 2018-07-28

## 2018-07-27 RX ORDER — IBUPROFEN 200 MG
600 TABLET ORAL ONCE
Qty: 0 | Refills: 0 | Status: COMPLETED | OUTPATIENT
Start: 2018-07-27 | End: 2019-06-25

## 2018-07-27 RX ORDER — SODIUM CHLORIDE 9 MG/ML
1000 INJECTION, SOLUTION INTRAVENOUS ONCE
Qty: 0 | Refills: 0 | Status: DISCONTINUED | OUTPATIENT
Start: 2018-07-27 | End: 2018-07-28

## 2018-07-27 RX ORDER — ACETAMINOPHEN 500 MG
975 TABLET ORAL EVERY 6 HOURS
Qty: 0 | Refills: 0 | Status: COMPLETED | OUTPATIENT
Start: 2018-07-27 | End: 2019-06-25

## 2018-07-27 RX ORDER — IBUPROFEN 200 MG
600 TABLET ORAL EVERY 6 HOURS
Qty: 0 | Refills: 0 | Status: COMPLETED | OUTPATIENT
Start: 2018-07-27 | End: 2019-06-25

## 2018-07-27 RX ORDER — OXYCODONE HYDROCHLORIDE 5 MG/1
5 TABLET ORAL
Qty: 0 | Refills: 0 | Status: DISCONTINUED | OUTPATIENT
Start: 2018-07-27 | End: 2018-07-28

## 2018-07-27 RX ORDER — IBUPROFEN 200 MG
600 TABLET ORAL ONCE
Qty: 0 | Refills: 0 | Status: COMPLETED | OUTPATIENT
Start: 2018-07-27 | End: 2018-07-27

## 2018-07-27 RX ADMIN — Medication 600 MILLIGRAM(S): at 15:30

## 2018-07-27 RX ADMIN — Medication 600 MILLIGRAM(S): at 10:28

## 2018-07-27 RX ADMIN — Medication 600 MILLIGRAM(S): at 15:01

## 2018-07-27 RX ADMIN — Medication 1 TABLET(S): at 11:25

## 2018-07-27 RX ADMIN — Medication 975 MILLIGRAM(S): at 21:50

## 2018-07-27 RX ADMIN — Medication 975 MILLIGRAM(S): at 20:52

## 2018-07-27 RX ADMIN — Medication 600 MILLIGRAM(S): at 21:30

## 2018-07-27 RX ADMIN — Medication 600 MILLIGRAM(S): at 20:51

## 2018-07-27 RX ADMIN — Medication 600 MILLIGRAM(S): at 11:15

## 2018-07-27 NOTE — DISCHARGE NOTE OB - MEDICATION SUMMARY - MEDICATIONS TO TAKE
I will START or STAY ON the medications listed below when I get home from the hospital:    ibuprofen 600 mg oral tablet  -- 1 tab(s) by mouth every 6 hours, As Needed   -- Indication: For Pain    Colace 100 mg oral capsule  -- 1 cap(s) by mouth 2 times a day, As Needed   -- Medication should be taken with plenty of water.    -- Indication: For Constipation I will START or STAY ON the medications listed below when I get home from the hospital:    ibuprofen 600 mg oral tablet  -- 1 tab(s) by mouth every 6 hours, As Needed   -- Indication: For Pain    acetaminophen 325 mg oral tablet  -- 3 tab(s) by mouth every 6 hours  -- Indication: For Pain    Prenatal Multivitamins with Folic Acid 1 mg oral tablet  -- 1 tab(s) by mouth once a day  -- Indication: For Postpartum    Colace 100 mg oral capsule  -- 1 cap(s) by mouth 2 times a day, As Needed   -- Medication should be taken with plenty of water.    -- Indication: For Constipation    norethindrone 0.35 mg oral tablet  -- 1 tab(s) by mouth once a day. Begin Sunday after the delivery.  -- Do not take this drug if you are pregnant.  It is very important that you take or use this exactly as directed.  Do not skip doses or discontinue unless directed by your doctor.    -- Indication: For Contraception

## 2018-07-27 NOTE — DISCHARGE NOTE OB - CARE PROVIDER_API CALL
[facility],   [address] [place] [address], [address]  [place] [address]    Please call for an appointment.  Phone: [phone]  Fax: (   )    -

## 2018-07-27 NOTE — DISCHARGE NOTE OB - CARE PLAN
Principal Discharge DX:	Vaginal delivery  Goal:	Recovery  Assessment and plan of treatment:	After discharge, please stay on pelvic rest for 6 weeks, meaning no sexual intercourse, no tampons and no douching.  No driving for 2 weeks as women can loose a lot of blood during delivery and there is a possibility of being lightheaded/fainting.  No lifting objects heavier than baby for two weeks.  Expect to have vaginal bleeding/spotting for up to six weeks.  The bleeding should get lighter and more white/light brown with time.  For bleeding soaking more than a pad an hour or passing clots greater than the size of your fist, come in to the emergency department.    Follow up in clinic in 6 weeks.

## 2018-07-27 NOTE — PROGRESS NOTE ADULT - ATTENDING COMMENTS
Patient complaints of feeling tired, + out of bed.   No HA, CP, SOB  No heavy vaginal bleeding (VB)/normal lochia  + vagal events this am w nml orthostatic vitals  in recovery room.     ICU Vital Signs Last 24 Hrs  T(C): 36.9 (2018 02:20), Max: 36.9 (2018 20:25)  HR: 82 (2018 06:50) (64 - 82)  BP: 106/52 (2018 06:50) (101/58 - 122/63)  BP(mean): 78 (2018 01:50) (72 - 85)  RR: 18 (2018 06:50) (16 - 18)  SpO2: 96% (2018 06:50) (96% - 100%)      PPD # 1 , currently stable condition  w recent vagal events advise to call staff for 1st out of bed on post partum floor. Increase PO hydration.   Patient seen and evaluated by me. I agree with resident note unless otherwise stated.   Routine postpartum care, regular diet as tolerated, ambulate and pain control as needed.     KENNEDY Tineo MD  Attending

## 2018-07-27 NOTE — PROGRESS NOTE ADULT - PROBLEM SELECTOR PLAN 1
- Pain well controlled, continue current pain regimen  - Increase ambulation, SCDs when not ambulating  - Continue regular diet    Risa Dewitt PGY-1

## 2018-07-27 NOTE — DISCHARGE NOTE OB - PLAN OF CARE
Recovery After discharge, please stay on pelvic rest for 6 weeks, meaning no sexual intercourse, no tampons and no douching.  No driving for 2 weeks as women can loose a lot of blood during delivery and there is a possibility of being lightheaded/fainting.  No lifting objects heavier than baby for two weeks.  Expect to have vaginal bleeding/spotting for up to six weeks.  The bleeding should get lighter and more white/light brown with time.  For bleeding soaking more than a pad an hour or passing clots greater than the size of your fist, come in to the emergency department.    Follow up in clinic in 6 weeks.

## 2018-07-27 NOTE — DISCHARGE NOTE OB - PATIENT PORTAL LINK FT
You can access the [facility] Patient Portal, offered by [facility], by registering with the following website: [email or web address]

## 2018-07-27 NOTE — PROGRESS NOTE ADULT - SUBJECTIVE AND OBJECTIVE BOX
OB Progress Note:  PPD#1    S: 36yo  PPD#1 s/p . Patient stayed in the PACU for additional time due to feeling dizzy upon standing. She reports feeling better after having fluid given via IV and she is able to stand up now without symptoms. Pain is well controlled. She is tolerating a regular diet and passing flatus. She is voiding spontaneously, and ambulating without difficulty. Denies CP/SOB. Denies lightheadedness/dizziness. Denies N/V.    O:  Vitals:  Vital Signs Last 24 Hrs  T(C): 36.9 (2018 02:20), Max: 36.9 (2018 20:25)  T(F): 98.4 (2018 02:20), Max: 98.4 (2018 20:25)  HR: 82 (2018 06:50) (64 - 82)  BP: 106/52 (2018 06:50) (101/58 - 122/63)  BP(mean): 78 (2018 01:50) (72 - 85)  RR: 18 (2018 06:50) (16 - 18)  SpO2: 96% (2018 06:50) (96% - 100%)    MEDICATIONS  (STANDING):  ibuprofen  Tablet 600 milliGRAM(s) Oral once  lactated ringers Bolus 1000 milliLiter(s) IV Bolus once  lactated ringers Bolus 1000 milliLiter(s) IV Bolus once  misoprostol 25 MICROGram(s) Vaginal once  ondansetron Injectable 4 milliGRAM(s) IV Push once      Labs:  Blood type: A Positive  Rubella IgG: Positive ( @ 10:37)  RPR: Negative                          10.2<L>   9.4 >-----------< 148<L>    (  @ 05:21 )             30.6<L>                        12.5   16.8<H> >-----------< 205    (  @ 00:12 )             37.3                        12.3   5.6 >-----------< 156    (  @ 08:55 )             39.8    - @ 00:12      140  |  106  |  9   ----------------------------<  103<H>  3.7   |  18<L>  |  0.44<L>        Ca    9.0      2018 00:12    TPro  6.3  /  Alb  3.4  /  TBili  1.0  /  DBili  x   /  AST  24  /  ALT  17  /  AlkPhos  201<H>  18 @ 00:12      Physical Exam:  General: NAD  Abdomen: soft, non-tender, non-distended, fundus firm  Vaginal: Lochia wnl  Extremities: No erythema/edema

## 2018-07-27 NOTE — DISCHARGE NOTE OB - MEDICATION SUMMARY - MEDICATIONS TO STOP TAKING
I will STOP taking the medications listed below when I get home from the hospital:    Zofran ODT 4 mg oral tablet, disintegrating  -- 1 tab(s) by mouth 3 times a day as needed for nausea

## 2018-07-27 NOTE — DISCHARGE NOTE OB - PROVIDER TOKENS
FREE:[LAST:[[facility]],PHONE:[phone]],FAX:[(   [phone]    -],ADDRESS:[[address] [place] [address]    Please call for an appointment.]]

## 2018-07-27 NOTE — DISCHARGE NOTE OB - HOSPITAL COURSE
Patient had uncomplicated, nonsurgical vaginal delivery.  Please see delivery note for details.  During postpartum course patient's vitals were stable, vaginal bleeding appropriate, and pain well controlled.  On day of discharge patient was ambulating, her pain controlled with oral medications, had adequate oral intake, and was voiding freely.  Discharge instructions and precautions were given.  Will return to clinic in 6 weeks for postpartum visit.  Postpartum birth control plan is condoms. Patient had uncomplicated, nonsurgical vaginal delivery.  Please see delivery note for details.  During postpartum course patient's vitals were stable, vaginal bleeding appropriate, and pain well controlled.  On day of discharge patient was ambulating, her pain controlled with oral medications, had adequate oral intake, and was voiding freely.  Discharge instructions and precautions were given.  Will return to clinic in 6 weeks for postpartum visit.  Postpartum birth control plan is micronor.

## 2018-07-28 VITALS
DIASTOLIC BLOOD PRESSURE: 64 MMHG | RESPIRATION RATE: 18 BRPM | HEART RATE: 84 BPM | SYSTOLIC BLOOD PRESSURE: 97 MMHG | TEMPERATURE: 98 F

## 2018-07-28 LAB
HCT VFR BLD CALC: 23 % — LOW (ref 34.5–45)
HGB BLD-MCNC: 7.6 G/DL — LOW (ref 11.5–15.5)
MCHC RBC-ENTMCNC: 29.1 PG — SIGNIFICANT CHANGE UP (ref 27–34)
MCHC RBC-ENTMCNC: 32.9 GM/DL — SIGNIFICANT CHANGE UP (ref 32–36)
MCV RBC AUTO: 88.4 FL — SIGNIFICANT CHANGE UP (ref 80–100)
PLATELET # BLD AUTO: 121 K/UL — LOW (ref 150–400)
RBC # BLD: 2.6 M/UL — LOW (ref 3.8–5.2)
RBC # FLD: 14.2 % — SIGNIFICANT CHANGE UP (ref 10.3–14.5)
WBC # BLD: 5.6 K/UL — SIGNIFICANT CHANGE UP (ref 3.8–10.5)
WBC # FLD AUTO: 5.6 K/UL — SIGNIFICANT CHANGE UP (ref 3.8–10.5)

## 2018-07-28 PROCEDURE — 86900 BLOOD TYPING SEROLOGIC ABO: CPT

## 2018-07-28 PROCEDURE — G0463: CPT

## 2018-07-28 PROCEDURE — 59050 FETAL MONITOR W/REPORT: CPT

## 2018-07-28 PROCEDURE — 83605 ASSAY OF LACTIC ACID: CPT

## 2018-07-28 PROCEDURE — 59025 FETAL NON-STRESS TEST: CPT

## 2018-07-28 PROCEDURE — 85610 PROTHROMBIN TIME: CPT

## 2018-07-28 PROCEDURE — 86780 TREPONEMA PALLIDUM: CPT

## 2018-07-28 PROCEDURE — 80053 COMPREHEN METABOLIC PANEL: CPT

## 2018-07-28 PROCEDURE — 85027 COMPLETE CBC AUTOMATED: CPT

## 2018-07-28 PROCEDURE — 86703 HIV-1/HIV-2 1 RESULT ANTBDY: CPT

## 2018-07-28 PROCEDURE — 90715 TDAP VACCINE 7 YRS/> IM: CPT

## 2018-07-28 PROCEDURE — 87340 HEPATITIS B SURFACE AG IA: CPT

## 2018-07-28 PROCEDURE — 86762 RUBELLA ANTIBODY: CPT

## 2018-07-28 PROCEDURE — 86850 RBC ANTIBODY SCREEN: CPT

## 2018-07-28 PROCEDURE — 85730 THROMBOPLASTIN TIME PARTIAL: CPT

## 2018-07-28 PROCEDURE — 86901 BLOOD TYPING SEROLOGIC RH(D): CPT

## 2018-07-28 RX ORDER — TETANUS TOXOID, REDUCED DIPHTHERIA TOXOID AND ACELLULAR PERTUSSIS VACCINE, ADSORBED 5; 2.5; 8; 8; 2.5 [IU]/.5ML; [IU]/.5ML; UG/.5ML; UG/.5ML; UG/.5ML
0.5 SUSPENSION INTRAMUSCULAR ONCE
Qty: 0 | Refills: 0 | Status: COMPLETED | OUTPATIENT
Start: 2018-07-28 | End: 2018-07-28

## 2018-07-28 RX ORDER — NORETHINDRONE 0.35 MG/1
1 TABLET ORAL
Qty: 30 | Refills: 3 | OUTPATIENT
Start: 2018-07-28 | End: 2018-11-24

## 2018-07-28 RX ORDER — ACETAMINOPHEN 500 MG
3 TABLET ORAL
Qty: 0 | Refills: 0 | COMMUNITY
Start: 2018-07-28

## 2018-07-28 RX ADMIN — Medication 1 TABLET(S): at 12:03

## 2018-07-28 RX ADMIN — Medication 600 MILLIGRAM(S): at 06:15

## 2018-07-28 RX ADMIN — Medication 600 MILLIGRAM(S): at 18:26

## 2018-07-28 RX ADMIN — Medication 100 MILLIGRAM(S): at 12:04

## 2018-07-28 RX ADMIN — TETANUS TOXOID, REDUCED DIPHTHERIA TOXOID AND ACELLULAR PERTUSSIS VACCINE, ADSORBED 0.5 MILLILITER(S): 5; 2.5; 8; 8; 2.5 SUSPENSION INTRAMUSCULAR at 17:01

## 2018-07-28 RX ADMIN — Medication 600 MILLIGRAM(S): at 07:00

## 2018-07-28 RX ADMIN — Medication 600 MILLIGRAM(S): at 19:00

## 2018-07-28 NOTE — PROGRESS NOTE ADULT - SUBJECTIVE AND OBJECTIVE BOX
OB Progress Note:  PPD#2    S: 36yo PPD#2 s/p . Patient feels well. Pain is well controlled. She is tolerating a regular diet and passing flatus. She is voiding spontaneously, and ambulating without difficulty. Denies CP/SOB. Denies lightheadedness/dizziness. Denies N/V.    O:  Vitals:   Vital Signs Last 24 Hrs  T(C): 36.5 (2018 05:00), Max: 36.9 (2018 09:10)  T(F): 97.7 (2018 05:00), Max: 98.5 (2018 17:08)  HR: 84 (2018 05:00) (78 - 84)  BP: 97/64 (2018 05:00) (97/54 - 101/69)  BP(mean): --  RR: 18 (2018 05:00) (17 - 20)  SpO2: 99% (2018 17:08) (99% - 99%)    MEDICATIONS  (STANDING):  acetaminophen   Tablet. 975 milliGRAM(s) Oral every 6 hours  ibuprofen  Tablet 600 milliGRAM(s) Oral every 6 hours  ketorolac   Injectable 30 milliGRAM(s) IV Push once  lactated ringers Bolus 1000 milliLiter(s) IV Bolus once  lactated ringers Bolus 1000 milliLiter(s) IV Bolus once  misoprostol 25 MICROGram(s) Vaginal once  ondansetron Injectable 4 milliGRAM(s) IV Push once  oxyCODONE    IR 5 milliGRAM(s) Oral every 3 hours  prenatal multivitamin 1 Tablet(s) Oral daily    MEDICATIONS  (PRN):  dibucaine 1% Ointment 1 Application(s) Topical every 4 hours PRN Perineal Discomfort  diphenhydrAMINE   Capsule 25 milliGRAM(s) Oral every 6 hours PRN Itching  docusate sodium 100 milliGRAM(s) Oral two times a day PRN Stool Softening  glycerin Suppository - Adult 1 Suppository(s) Rectal at bedtime PRN Constipation  hydrocortisone 1% Cream 1 Application(s) Topical every 4 hours PRN Moderate to Severe Perineal Pain  lanolin Ointment 1 Application(s) Topical every 6 hours PRN Sore Nipples  magnesium hydroxide Suspension 30 milliLiter(s) Oral two times a day PRN Constipation  oxyCODONE    IR 5 milliGRAM(s) Oral every 4 hours PRN Severe Pain (7 -10)  pramoxine 1%/zinc 5% Cream 1 Application(s) Topical every 4 hours PRN Moderate to Severe Perineal Pain  simethicone 80 milliGRAM(s) Chew every 6 hours PRN Gas  witch hazel Pads 1 Application(s) Topical every 4 hours PRN Perineal Discomfort      Labs:  Blood type: A Positive  Rubella IgG: Positive ( @ 10:37)  RPR: Negative                          10.2<L>   9.4 >-----------< 148<L>    (  @ 05:21 )             30.6<L>                        12.5   16.8<H> >-----------< 205    (  @ 00:12 )             37.3                        12.3   5.6 >-----------< 156    (  @ 08:55 )             39.8    18 @ 00:12      140  |  106  |  9   ----------------------------<  103<H>  3.7   |  18<L>  |  0.44<L>        Ca    9.0      2018 00:12    TPro  6.3  /  Alb  3.4  /  TBili  1.0  /  DBili  x   /  AST  24  /  ALT  17  /  AlkPhos  201<H>  18 @ 00:12      Physical Exam:  General: NAD  Abdomen: soft, non-tender, non-distended, fundus firm  Vaginal: Lochia wnl  Extremities: No erythema/edema

## 2018-07-28 NOTE — PROGRESS NOTE ADULT - PROBLEM SELECTOR PLAN 1
- Pain well controlled, continue current pain regimen  - Increase ambulation, SCDs when not ambulating  - Continue regular diet  - Discharge planning     Risa Dewitt PGY-1

## 2018-07-28 NOTE — PROGRESS NOTE ADULT - ATTENDING COMMENTS
patient seen and examined at bedside, agree with above plan. discharge home today. All questions answered, denies suicidal/ homicidal ideation. states she feels safe at home. Has follow up in clinic in 6 weeks.

## 2018-07-31 ENCOUNTER — INPATIENT (INPATIENT)
Facility: HOSPITAL | Age: 38
LOS: 2 days | Discharge: HOME | DRG: 781 | End: 2018-08-03
Attending: INTERNAL MEDICINE | Admitting: INTERNAL MEDICINE
Payer: MEDICAID

## 2018-07-31 VITALS
WEIGHT: 169.98 LBS | HEART RATE: 88 BPM | RESPIRATION RATE: 18 BRPM | HEIGHT: 67 IN | DIASTOLIC BLOOD PRESSURE: 78 MMHG | OXYGEN SATURATION: 98 % | SYSTOLIC BLOOD PRESSURE: 133 MMHG | TEMPERATURE: 98 F

## 2018-07-31 LAB
ALBUMIN SERPL ELPH-MCNC: 2.4 G/DL — LOW (ref 3.5–5)
ALBUMIN SERPL ELPH-MCNC: 2.6 G/DL — LOW (ref 3.5–5)
ALP SERPL-CCNC: 122 U/L — HIGH (ref 40–120)
ALP SERPL-CCNC: 134 U/L — HIGH (ref 40–120)
ALT FLD-CCNC: 55 U/L DA — SIGNIFICANT CHANGE UP (ref 10–60)
ALT FLD-CCNC: 62 U/L DA — HIGH (ref 10–60)
ANION GAP SERPL CALC-SCNC: 6 MMOL/L — SIGNIFICANT CHANGE UP (ref 5–17)
ANION GAP SERPL CALC-SCNC: 8 MMOL/L — SIGNIFICANT CHANGE UP (ref 5–17)
APPEARANCE UR: SIGNIFICANT CHANGE UP
AST SERPL-CCNC: 26 U/L — SIGNIFICANT CHANGE UP (ref 10–40)
AST SERPL-CCNC: 27 U/L — SIGNIFICANT CHANGE UP (ref 10–40)
BASOPHILS # BLD AUTO: 0.1 K/UL — SIGNIFICANT CHANGE UP (ref 0–0.2)
BASOPHILS NFR BLD AUTO: 1 % — SIGNIFICANT CHANGE UP (ref 0–2)
BILIRUB SERPL-MCNC: 0.3 MG/DL — SIGNIFICANT CHANGE UP (ref 0.2–1.2)
BILIRUB SERPL-MCNC: 0.3 MG/DL — SIGNIFICANT CHANGE UP (ref 0.2–1.2)
BILIRUB UR-MCNC: NEGATIVE — SIGNIFICANT CHANGE UP
BUN SERPL-MCNC: 12 MG/DL — SIGNIFICANT CHANGE UP (ref 7–18)
BUN SERPL-MCNC: 14 MG/DL — SIGNIFICANT CHANGE UP (ref 7–18)
CALCIUM SERPL-MCNC: 8 MG/DL — LOW (ref 8.4–10.5)
CALCIUM SERPL-MCNC: 8.4 MG/DL — SIGNIFICANT CHANGE UP (ref 8.4–10.5)
CHLORIDE SERPL-SCNC: 106 MMOL/L — SIGNIFICANT CHANGE UP (ref 96–108)
CHLORIDE SERPL-SCNC: 109 MMOL/L — HIGH (ref 96–108)
CO2 SERPL-SCNC: 24 MMOL/L — SIGNIFICANT CHANGE UP (ref 22–31)
CO2 SERPL-SCNC: 27 MMOL/L — SIGNIFICANT CHANGE UP (ref 22–31)
COLOR SPEC: ABNORMAL
CREAT SERPL-MCNC: 0.36 MG/DL — LOW (ref 0.5–1.3)
CREAT SERPL-MCNC: 0.41 MG/DL — LOW (ref 0.5–1.3)
DIFF PNL FLD: ABNORMAL
EOSINOPHIL # BLD AUTO: 0.1 K/UL — SIGNIFICANT CHANGE UP (ref 0–0.5)
EOSINOPHIL NFR BLD AUTO: 1 % — SIGNIFICANT CHANGE UP (ref 0–6)
ERYTHROCYTE [SEDIMENTATION RATE] IN BLOOD: 67 MM/HR — HIGH (ref 0–15)
GLUCOSE SERPL-MCNC: 86 MG/DL — SIGNIFICANT CHANGE UP (ref 70–99)
GLUCOSE SERPL-MCNC: 90 MG/DL — SIGNIFICANT CHANGE UP (ref 70–99)
GLUCOSE UR QL: NEGATIVE — SIGNIFICANT CHANGE UP
HCT VFR BLD CALC: 28.3 % — LOW (ref 34.5–45)
HGB BLD-MCNC: 8.9 G/DL — LOW (ref 11.5–15.5)
KETONES UR-MCNC: NEGATIVE — SIGNIFICANT CHANGE UP
LACTATE SERPL-SCNC: 1 MMOL/L — SIGNIFICANT CHANGE UP (ref 0.7–2)
LEUKOCYTE ESTERASE UR-ACNC: ABNORMAL
LYMPHOCYTES # BLD AUTO: 0.5 K/UL — LOW (ref 1–3.3)
LYMPHOCYTES # BLD AUTO: 9.2 % — LOW (ref 13–44)
MCHC RBC-ENTMCNC: 27.7 PG — SIGNIFICANT CHANGE UP (ref 27–34)
MCHC RBC-ENTMCNC: 31.6 GM/DL — LOW (ref 32–36)
MCV RBC AUTO: 87.9 FL — SIGNIFICANT CHANGE UP (ref 80–100)
MONOCYTES # BLD AUTO: 0.3 K/UL — SIGNIFICANT CHANGE UP (ref 0–0.9)
MONOCYTES NFR BLD AUTO: 5.8 % — SIGNIFICANT CHANGE UP (ref 2–14)
NEUTROPHILS # BLD AUTO: 4.9 K/UL — SIGNIFICANT CHANGE UP (ref 1.8–7.4)
NEUTROPHILS NFR BLD AUTO: 83.1 % — HIGH (ref 43–77)
NITRITE UR-MCNC: NEGATIVE — SIGNIFICANT CHANGE UP
NT-PROBNP SERPL-SCNC: 35 PG/ML — SIGNIFICANT CHANGE UP (ref 0–125)
PH UR: 8 — SIGNIFICANT CHANGE UP (ref 5–8)
PLATELET # BLD AUTO: 204 K/UL — SIGNIFICANT CHANGE UP (ref 150–400)
POTASSIUM SERPL-MCNC: 3.8 MMOL/L — SIGNIFICANT CHANGE UP (ref 3.5–5.3)
POTASSIUM SERPL-MCNC: 3.9 MMOL/L — SIGNIFICANT CHANGE UP (ref 3.5–5.3)
POTASSIUM SERPL-SCNC: 3.8 MMOL/L — SIGNIFICANT CHANGE UP (ref 3.5–5.3)
POTASSIUM SERPL-SCNC: 3.9 MMOL/L — SIGNIFICANT CHANGE UP (ref 3.5–5.3)
PROT SERPL-MCNC: 6.3 G/DL — SIGNIFICANT CHANGE UP (ref 6–8.3)
PROT SERPL-MCNC: 6.8 G/DL — SIGNIFICANT CHANGE UP (ref 6–8.3)
PROT UR-MCNC: 100
RBC # BLD: 3.22 M/UL — LOW (ref 3.8–5.2)
RBC # FLD: 13.8 % — SIGNIFICANT CHANGE UP (ref 10.3–14.5)
SODIUM SERPL-SCNC: 139 MMOL/L — SIGNIFICANT CHANGE UP (ref 135–145)
SODIUM SERPL-SCNC: 141 MMOL/L — SIGNIFICANT CHANGE UP (ref 135–145)
SP GR SPEC: 1.01 — SIGNIFICANT CHANGE UP (ref 1.01–1.02)
TROPONIN I SERPL-MCNC: <0.015 NG/ML — SIGNIFICANT CHANGE UP (ref 0–0.04)
UROBILINOGEN FLD QL: NEGATIVE — SIGNIFICANT CHANGE UP
WBC # BLD: 5.9 K/UL — SIGNIFICANT CHANGE UP (ref 3.8–10.5)
WBC # FLD AUTO: 5.9 K/UL — SIGNIFICANT CHANGE UP (ref 3.8–10.5)

## 2018-07-31 PROCEDURE — 72146 MRI CHEST SPINE W/O DYE: CPT | Mod: 26

## 2018-07-31 PROCEDURE — 70450 CT HEAD/BRAIN W/O DYE: CPT | Mod: 26

## 2018-07-31 PROCEDURE — 72141 MRI NECK SPINE W/O DYE: CPT | Mod: 26

## 2018-07-31 PROCEDURE — 71275 CT ANGIOGRAPHY CHEST: CPT | Mod: 26

## 2018-07-31 PROCEDURE — 72148 MRI LUMBAR SPINE W/O DYE: CPT | Mod: 26

## 2018-07-31 RX ORDER — KETOROLAC TROMETHAMINE 30 MG/ML
15 SYRINGE (ML) INJECTION ONCE
Qty: 0 | Refills: 0 | Status: DISCONTINUED | OUTPATIENT
Start: 2018-07-31 | End: 2018-07-31

## 2018-07-31 RX ORDER — MORPHINE SULFATE 50 MG/1
4 CAPSULE, EXTENDED RELEASE ORAL ONCE
Qty: 0 | Refills: 0 | Status: DISCONTINUED | OUTPATIENT
Start: 2018-07-31 | End: 2018-07-31

## 2018-07-31 RX ORDER — ACETAMINOPHEN 500 MG
1000 TABLET ORAL ONCE
Qty: 0 | Refills: 0 | Status: COMPLETED | OUTPATIENT
Start: 2018-07-31 | End: 2018-07-31

## 2018-07-31 RX ADMIN — Medication 15 MILLIGRAM(S): at 18:07

## 2018-07-31 RX ADMIN — Medication 400 MILLIGRAM(S): at 21:48

## 2018-07-31 RX ADMIN — Medication 15 MILLIGRAM(S): at 19:08

## 2018-07-31 RX ADMIN — MORPHINE SULFATE 4 MILLIGRAM(S): 50 CAPSULE, EXTENDED RELEASE ORAL at 17:00

## 2018-07-31 RX ADMIN — MORPHINE SULFATE 4 MILLIGRAM(S): 50 CAPSULE, EXTENDED RELEASE ORAL at 16:27

## 2018-07-31 NOTE — CONSULT NOTE ADULT - SUBJECTIVE AND OBJECTIVE BOX
HPI: 38yo F para 3, PPD #5 s/p  at [facility], no significant pmhx, presents to ED c/o head, neck, chest, and back pain after delivery. States the pain is neck, head, and back pain is severe which prompted her to come to the ED. States she had epidural placed intrapartum for pain management. She is also experiencing abdominal pain and leg swelling. Patient has subjective fever at home. Vaginal delivery complicated by perineal tear, and post partum bleeding per pt. States she did not receive blood transfusion but she says the staff did a lot of vaginal exams after the delivery. Chaning pads [id number], they are never soaked with blood only down the center per pt.  Also states that she fainted while she was on postpartum at [facility].  States she became anemic at the end of her pregnancy and was taking iron.     pob/gynhx: para 3, PPD #5  pmhx: denies  pshx: denies  all: eggs  meds: iburpofen, iron, PNV  sochx: no etoh/drug/tobacco use    REVIEW OF SYSTEMS: see HPI	    PE:  Vital Signs Last 24 Hrs  T(C): 36.7 (2018 15:25), Max: 36.7 (2018 15:25)  T(F): 98.1 (2018 15:25), Max: 98.1 (2018 15:25)  HR: 80 (2018 15:25) (80 - 88)  BP: 110/46 (2018 15:25) (110/46 - 133/78)  BP(mean): --  RR: 18 (2018 15:25) (18 - 18)  SpO2: 98% (2018 15:25) (98% - 98%)    gen: patient laying on stretcher, appears uncomfortable   abd: +bs; soft, nd, tenderness throughout lower abdomen, fundus firm   pelvis: normal lochia    LABS:                        8.9    5.9   )-----------( 204      ( 2018 15:06 )             28.3     07-31    141  |  109<H>  |  12  ----------------------------<  90  3.8   |  24  |  0.36<L>    Ca    8.0<L>      2018 16:37    TPro  6.3  /  Alb  2.4<L>  /  TBili  0.3  /  DBili  x   /  AST  26  /  ALT  55  /  AlkPhos  122<H>  07-      Urinalysis Basic - ( 2018 17:11 )    Color: Pink / Appearance: Hazy / S.015 / pH: x  Gluc: x / Ketone: Negative  / Bili: Negative / Urobili: Negative   Blood: x / Protein: 100 / Nitrite: Negative   Leuk Esterase: Moderate / RBC: >50 /HPF / WBC 11-25 /HPF   Sq Epi: x / Non Sq Epi: Few /HPF / Bacteria: Few /HPF        a/p: 38yo F, para 3, PPD #5 s/p  now with severe head, neck, and back pain.   - D/w Dr. Field, main presenting complaints of headache, back pain, and chest pain do not appear to be related to an obstetrical cause  - Lochia is appropriate for PPD #5, obstetrical exam is c/w    - Hgb improved from previous H/H, vital signs stable  - Reconsult GYN as necessary  -d/w Dr Field, attending on call

## 2018-07-31 NOTE — ED ADULT NURSE NOTE - NSIMPLEMENTINTERV_GEN_ALL_ED
Implemented All Universal Safety Interventions:  [place] to call system. Call bell, personal items and telephone within reach. Instruct patient to call for assistance. Room bathroom lighting operational. Non-slip footwear when patient is off stretcher. Physically safe environment: no spills, clutter or unnecessary equipment. Stretcher in lowest position, wheels locked, appropriate side rails in place.

## 2018-07-31 NOTE — ED PROVIDER NOTE - OBJECTIVE STATEMENT
37F  5d post partum term VD c/b post partum hemorrhage 2/2 possible retained products (unclear from pt/documentation) and perineal laceration p/w ~2-3d subj f/c diffuse HA posterior neck pain and 37F  5d post partum term VD c/b post partum hemorrhage 2/2 possible retained products (unclear from pt/documentation) and perineal laceration p/w ~2-3d subj f/c diffuse HA posterior neck pain 37F  5d post partum term VD c/b post partum hemorrhage 2/2 possible retained products (unclear from pt/documentation) and perineal laceration p/w ~2-3d subj f/c diffuse HA with gradual onset over many hours, posterior neck pain generalized weakness/fatigue pelvic pain vaginal bleeding. Pelvic pain and vaginal bleeding have been gradually improving since discharge from [place] 3d ago. Also c/o cough productive of white sputum and constant sternal chest pain/tenderness since 5am today aggravated by palpation certain movements and deep breathing and mild intermittent b/l symmetric LELA relieved by elevated lower extremities. Pt says HA delayed onset since epidural. Pt denies focality of HA, back pain, change in vision/speech/gait, focal numbness/weakness, vertigo, bleeding diatheses, AMS, ac/ap tx, prior episodes, trauma, sudden onset, SOB, cardiac hx, exertional CP, hemoptysis, calf pain, prepartum complications, dvt/pe/hypercoag hx

## 2018-07-31 NOTE — ED PROVIDER NOTE - PHYSICAL EXAMINATION
PE:   GEN: Awake, alert, oriented, interactive, NAD, non-toxic appearing, uncomfortable appearing  HEAD: Atraumatic, normocephalic  EYES: Sclera white, conjunctiva pink, PERRLA  CARDIAC: Reg rate and rhythm, S1,S2, no murmur/rub/gallop. Strong central and peripheral pulses, Brisk cap refill, mild nonpitting symmetric LELA without calf tenderness  RESP: No distress noted. L/S clear = Bilat without accessory muscle use, wheeze, rhonchi, rales.   ABD: soft, supple, non-tender, no guarding. BS x 4, normoactive.   NEURO: AOx3, CN II-XII grossly intact without focal deficit, strength and sensation wnl 4/4 ext  MSK: Moving all extremities with no apparent deformities, diffuse c-spine tenderness with normal inspection no deformity no masses, ROM limited by pain  SKIN: Warm, dry, normal color, without apparent rashes.  GYN: healing lac repair without erythema edema purulence or tenderness, dark red blood filling vaginal canal without discharge, slow reaccumulation, unable to palpated os bc pt intolerant of exam

## 2018-07-31 NOTE — ED PROVIDER NOTE - PROGRESS NOTE DETAILS
Patient signed out to me by Dr. Rodriguez. Four days post-. Had course at [facility] complicated by near syncope but discharged in good condition. Today developed severe ha, neck pain, chest pain, low back pain. After morphine, patient only c/o neck stiffness and low back pain. Patient seen and reevaluated by me. positive neck stiffness with diffuse c-spine tenderness. positive low back tenderness, midline. Abdomen soft, non-tender, non-distended. Normal bowel sounds. No guarding or rebound. Heart RRR, normal SI/S2. Resp: Lungs CTAB b/l, No respiratory distress. Neuro: CNII-XII intact. Speech clear. Reflexes 2+/4 in all extremities. Strength 5/5 in all extremities. Normal coordination. Dr. Rodriguez's plan is to do CTA chest for PE if MRI is negative. Also awaiting gyn consult. Called by Lora gyn PA. She will reach out to [facility] to contact patient's ob. Called back by Lora, she called gyn at [facility] IMMANUEL Benavides. Was a routine case. Not a candidate for transfer to gyn service for continuity of care because she is an unregistered patient. L&D [place] [phone]. If patient is discharged, she can follow up in [place] [facility] tomorrow morning at [phone]. Lora said if MRI is negative, she would also check for bleeding in brain. Agrees with d/c if all testing negative. Patient requesting more pain medicine but does not morphine. Will give IV acetaminophen. Patient reports low back pain still severe, unable to stand due to pain. Also reports chest pain peristent, but improved. I spoke with IMMANUEL Paulino again, who reiterated that this is not an obstetrical issue. Will admit to medicine service.

## 2018-07-31 NOTE — CONSULT NOTE ADULT - ASSESSMENT
a/p: 36yo F, para 3, PPD #5 s/p  now with severe head, neck, and back pain.   - D/w Dr. Field, main presenting complaints of headache, back pain, and chest pain do not appear to be related to an obstetrical cause  - Lochia is appropriate for PPD #5, obstetrical exam is c/w    - Hgb improved from previous H/H, vital signs stable  - Reconsult GYN as necessary  -d/w Dr Field, attending on call

## 2018-07-31 NOTE — ED PROVIDER NOTE - CARE PLAN
Principal Discharge DX:	Chest pain, unspecified type  Secondary Diagnosis:	Acute midline low back pain without sciatica

## 2018-07-31 NOTE — ED PROVIDER NOTE - MEDICAL DECISION MAKING DETAILS
numerous symptoms 5d postpartum, nuchal rigidity subjective f/c c-spine pain post epidural, cp and symmetric marjorie. VS wnl, afebrile off antipyretics, no analgesia pta. Unlikely preeclampsia or pres given normotensive. Unlikely meningitis given afebrile no leukocytosis several days of symptoms but r/o if symptoms persist and other causes ruled out. R/o epidural abscess, postpartum cardiomyopathy, endometritis. Unlikely PE given symmetric mild edema and CP not primary symptom, defer r/o as per change in clinical picture. EKG, CBC, ESR/CRP, MRI spine, pain control, lactate, cx, gyn consult, likely admit.

## 2018-07-31 NOTE — ED PROVIDER NOTE - NS ED ROS FT
Constitutional: +Fever, +Chills, - Anorexia, + Fatigue  Eyes: - Discharge, - Irritation, - Redness, - Visual changes, - Light sensitivity  EARS: - Ear Pain, - Apparent hearing problems  NOSE: - Congestion, - Bloody nose  MOUTH/THROAT: - Vocal Changes, - Drooling, - Sore throat  NECK: - Lumps, - Stiffness, - Pain  CV: - Palpitations, - Chest Pain, - Edema   RESP:  + Cough, - Shortness of Breath, - Dyspnea on Exertion, - Trouble speaking,   GI: - Diarrhea, - Constipation, - Bloody stools, - Nausea, - Vomiting, - Abdominal Pain  : - Dysuria, -Frequency, - Hematuria, - Hesitancy, - Incontinence, - Saddle Anesthesia  MSK: - Myalgias, - Arthralgias, + Weakness, - Deformities, - Injuries  SKIN: - Color change, - Rash, - Swelling, - Bruises, - Wounds  NEURO: - Change in behavior, - Dec. Alertness, - Headache, - Dizziness, - Change in speech, Seizure-like activity

## 2018-07-31 NOTE — ED ADULT NURSE NOTE - OBJECTIVE STATEMENT
biba c/o subjective fevers , body pains , 5 days post partum , tearful mostly . seen and examined by md , bloods drawn as ordered .

## 2018-07-31 NOTE — ED PROVIDER NOTE - CRITICAL CARE PROVIDED
additional history taking/documentation/direct patient care (not related to procedure)/interpretation of diagnostic studies/consultation with other physicians/consult w/ pt's family directly relating to pts condition

## 2018-08-01 ENCOUNTER — OUTPATIENT (OUTPATIENT)
Dept: OUTPATIENT SERVICES | Facility: HOSPITAL | Age: 38
LOS: 1 days | End: 2018-08-01
Payer: MEDICAID

## 2018-08-01 DIAGNOSIS — R52 PAIN, UNSPECIFIED: ICD-10-CM

## 2018-08-01 DIAGNOSIS — R07.9 CHEST PAIN, UNSPECIFIED: ICD-10-CM

## 2018-08-01 DIAGNOSIS — Z29.9 ENCOUNTER FOR PROPHYLACTIC MEASURES, UNSPECIFIED: ICD-10-CM

## 2018-08-01 DIAGNOSIS — M54.2 CERVICALGIA: ICD-10-CM

## 2018-08-01 LAB
ANION GAP SERPL CALC-SCNC: 7 MMOL/L — SIGNIFICANT CHANGE UP (ref 5–17)
ANION GAP SERPL CALC-SCNC: 7 MMOL/L — SIGNIFICANT CHANGE UP (ref 5–17)
BASOPHILS # BLD AUTO: 0 K/UL — SIGNIFICANT CHANGE UP (ref 0–0.2)
BASOPHILS # BLD AUTO: 0 K/UL — SIGNIFICANT CHANGE UP (ref 0–0.2)
BASOPHILS NFR BLD AUTO: 0.4 % — SIGNIFICANT CHANGE UP (ref 0–2)
BASOPHILS NFR BLD AUTO: 0.5 % — SIGNIFICANT CHANGE UP (ref 0–2)
BUN SERPL-MCNC: 11 MG/DL — SIGNIFICANT CHANGE UP (ref 7–18)
BUN SERPL-MCNC: 14 MG/DL — SIGNIFICANT CHANGE UP (ref 7–18)
CALCIUM SERPL-MCNC: 8.5 MG/DL — SIGNIFICANT CHANGE UP (ref 8.4–10.5)
CALCIUM SERPL-MCNC: 8.7 MG/DL — SIGNIFICANT CHANGE UP (ref 8.4–10.5)
CHLORIDE SERPL-SCNC: 106 MMOL/L — SIGNIFICANT CHANGE UP (ref 96–108)
CHLORIDE SERPL-SCNC: 107 MMOL/L — SIGNIFICANT CHANGE UP (ref 96–108)
CHOLEST SERPL-MCNC: 241 MG/DL — HIGH (ref 10–199)
CK MB BLD-MCNC: <2.2 % — SIGNIFICANT CHANGE UP (ref 0–3.5)
CK MB CFR SERPL CALC: <1 NG/ML — SIGNIFICANT CHANGE UP (ref 0–3.6)
CK SERPL-CCNC: 46 U/L — SIGNIFICANT CHANGE UP (ref 21–215)
CO2 SERPL-SCNC: 25 MMOL/L — SIGNIFICANT CHANGE UP (ref 22–31)
CO2 SERPL-SCNC: 26 MMOL/L — SIGNIFICANT CHANGE UP (ref 22–31)
CREAT SERPL-MCNC: 0.45 MG/DL — LOW (ref 0.5–1.3)
CREAT SERPL-MCNC: 0.63 MG/DL — SIGNIFICANT CHANGE UP (ref 0.5–1.3)
CRP SERPL-MCNC: 3.32 MG/DL — HIGH (ref 0–0.4)
EOSINOPHIL # BLD AUTO: 0.1 K/UL — SIGNIFICANT CHANGE UP (ref 0–0.5)
EOSINOPHIL # BLD AUTO: 0.1 K/UL — SIGNIFICANT CHANGE UP (ref 0–0.5)
EOSINOPHIL NFR BLD AUTO: 0.9 % — SIGNIFICANT CHANGE UP (ref 0–6)
EOSINOPHIL NFR BLD AUTO: 1.3 % — SIGNIFICANT CHANGE UP (ref 0–6)
GLUCOSE SERPL-MCNC: 107 MG/DL — HIGH (ref 70–99)
GLUCOSE SERPL-MCNC: 111 MG/DL — HIGH (ref 70–99)
HBA1C BLD-MCNC: 4.9 % — SIGNIFICANT CHANGE UP (ref 4–5.6)
HCT VFR BLD CALC: 28.2 % — LOW (ref 34.5–45)
HCT VFR BLD CALC: 29.7 % — LOW (ref 34.5–45)
HDLC SERPL-MCNC: 61 MG/DL — SIGNIFICANT CHANGE UP (ref 40–125)
HGB BLD-MCNC: 9.1 G/DL — LOW (ref 11.5–15.5)
HGB BLD-MCNC: 9.3 G/DL — LOW (ref 11.5–15.5)
INR BLD: 1 RATIO — SIGNIFICANT CHANGE UP (ref 0.88–1.16)
INR BLD: 1.03 RATIO — SIGNIFICANT CHANGE UP (ref 0.88–1.16)
LACTATE SERPL-SCNC: 1.1 MMOL/L — SIGNIFICANT CHANGE UP (ref 0.7–2)
LIPID PNL WITH DIRECT LDL SERPL: 157 MG/DL — SIGNIFICANT CHANGE UP
LYMPHOCYTES # BLD AUTO: 0.6 K/UL — LOW (ref 1–3.3)
LYMPHOCYTES # BLD AUTO: 0.9 K/UL — LOW (ref 1–3.3)
LYMPHOCYTES # BLD AUTO: 12.6 % — LOW (ref 13–44)
LYMPHOCYTES # BLD AUTO: 15.3 % — SIGNIFICANT CHANGE UP (ref 13–44)
MAGNESIUM SERPL-MCNC: 2 MG/DL — SIGNIFICANT CHANGE UP (ref 1.6–2.6)
MAGNESIUM SERPL-MCNC: 2.1 MG/DL — SIGNIFICANT CHANGE UP (ref 1.6–2.6)
MCHC RBC-ENTMCNC: 27.8 PG — SIGNIFICANT CHANGE UP (ref 27–34)
MCHC RBC-ENTMCNC: 28.3 PG — SIGNIFICANT CHANGE UP (ref 27–34)
MCHC RBC-ENTMCNC: 31.3 GM/DL — LOW (ref 32–36)
MCHC RBC-ENTMCNC: 32.2 GM/DL — SIGNIFICANT CHANGE UP (ref 32–36)
MCV RBC AUTO: 87.8 FL — SIGNIFICANT CHANGE UP (ref 80–100)
MCV RBC AUTO: 88.7 FL — SIGNIFICANT CHANGE UP (ref 80–100)
MONOCYTES # BLD AUTO: 0.2 K/UL — SIGNIFICANT CHANGE UP (ref 0–0.9)
MONOCYTES # BLD AUTO: 0.3 K/UL — SIGNIFICANT CHANGE UP (ref 0–0.9)
MONOCYTES NFR BLD AUTO: 4.7 % — SIGNIFICANT CHANGE UP (ref 2–14)
MONOCYTES NFR BLD AUTO: 5.3 % — SIGNIFICANT CHANGE UP (ref 2–14)
NEUTROPHILS # BLD AUTO: 3.8 K/UL — SIGNIFICANT CHANGE UP (ref 1.8–7.4)
NEUTROPHILS # BLD AUTO: 4.8 K/UL — SIGNIFICANT CHANGE UP (ref 1.8–7.4)
NEUTROPHILS NFR BLD AUTO: 78.1 % — HIGH (ref 43–77)
NEUTROPHILS NFR BLD AUTO: 80.9 % — HIGH (ref 43–77)
PHOSPHATE SERPL-MCNC: 3.4 MG/DL — SIGNIFICANT CHANGE UP (ref 2.5–4.5)
PHOSPHATE SERPL-MCNC: 3.9 MG/DL — SIGNIFICANT CHANGE UP (ref 2.5–4.5)
PLATELET # BLD AUTO: 214 K/UL — SIGNIFICANT CHANGE UP (ref 150–400)
PLATELET # BLD AUTO: 218 K/UL — SIGNIFICANT CHANGE UP (ref 150–400)
POTASSIUM SERPL-MCNC: 3.7 MMOL/L — SIGNIFICANT CHANGE UP (ref 3.5–5.3)
POTASSIUM SERPL-MCNC: 4.1 MMOL/L — SIGNIFICANT CHANGE UP (ref 3.5–5.3)
POTASSIUM SERPL-SCNC: 3.7 MMOL/L — SIGNIFICANT CHANGE UP (ref 3.5–5.3)
POTASSIUM SERPL-SCNC: 4.1 MMOL/L — SIGNIFICANT CHANGE UP (ref 3.5–5.3)
PROTHROM AB SERPL-ACNC: 10.9 SEC — SIGNIFICANT CHANGE UP (ref 9.8–12.7)
PROTHROM AB SERPL-ACNC: 11.2 SEC — SIGNIFICANT CHANGE UP (ref 9.8–12.7)
RBC # BLD: 3.21 M/UL — LOW (ref 3.8–5.2)
RBC # BLD: 3.35 M/UL — LOW (ref 3.8–5.2)
RBC # FLD: 13.2 % — SIGNIFICANT CHANGE UP (ref 10.3–14.5)
RBC # FLD: 13.9 % — SIGNIFICANT CHANGE UP (ref 10.3–14.5)
SODIUM SERPL-SCNC: 139 MMOL/L — SIGNIFICANT CHANGE UP (ref 135–145)
SODIUM SERPL-SCNC: 139 MMOL/L — SIGNIFICANT CHANGE UP (ref 135–145)
TOTAL CHOLESTEROL/HDL RATIO MEASUREMENT: 4 RATIO — SIGNIFICANT CHANGE UP (ref 3.3–7.1)
TRIGL SERPL-MCNC: 117 MG/DL — SIGNIFICANT CHANGE UP (ref 10–149)
TROPONIN I SERPL-MCNC: <0.015 NG/ML — SIGNIFICANT CHANGE UP (ref 0–0.04)
TSH SERPL-MCNC: 1.38 UU/ML — SIGNIFICANT CHANGE UP (ref 0.34–4.82)
WBC # BLD: 4.8 K/UL — SIGNIFICANT CHANGE UP (ref 3.8–10.5)
WBC # BLD: 6.1 K/UL — SIGNIFICANT CHANGE UP (ref 3.8–10.5)
WBC # FLD AUTO: 4.8 K/UL — SIGNIFICANT CHANGE UP (ref 3.8–10.5)
WBC # FLD AUTO: 6.1 K/UL — SIGNIFICANT CHANGE UP (ref 3.8–10.5)

## 2018-08-01 PROCEDURE — G9001: CPT

## 2018-08-01 PROCEDURE — 99291 CRITICAL CARE FIRST HOUR: CPT

## 2018-08-01 RX ORDER — CYCLOBENZAPRINE HYDROCHLORIDE 10 MG/1
5 TABLET, FILM COATED ORAL THREE TIMES A DAY
Qty: 0 | Refills: 0 | Status: DISCONTINUED | OUTPATIENT
Start: 2018-08-01 | End: 2018-08-03

## 2018-08-01 RX ORDER — DOCUSATE SODIUM 100 MG
100 CAPSULE ORAL DAILY
Qty: 0 | Refills: 0 | Status: DISCONTINUED | OUTPATIENT
Start: 2018-08-01 | End: 2018-08-03

## 2018-08-01 RX ORDER — LORATADINE 10 MG/1
10 TABLET ORAL DAILY
Qty: 0 | Refills: 0 | Status: DISCONTINUED | OUTPATIENT
Start: 2018-08-01 | End: 2018-08-03

## 2018-08-01 RX ORDER — FERROUS SULFATE 325(65) MG
325 TABLET ORAL
Qty: 0 | Refills: 0 | Status: DISCONTINUED | OUTPATIENT
Start: 2018-08-01 | End: 2018-08-03

## 2018-08-01 RX ORDER — SODIUM CHLORIDE 9 MG/ML
1000 INJECTION INTRAMUSCULAR; INTRAVENOUS; SUBCUTANEOUS
Qty: 0 | Refills: 0 | Status: DISCONTINUED | OUTPATIENT
Start: 2018-08-01 | End: 2018-08-03

## 2018-08-01 RX ORDER — ACETAMINOPHEN 500 MG
650 TABLET ORAL EVERY 6 HOURS
Qty: 0 | Refills: 0 | Status: DISCONTINUED | OUTPATIENT
Start: 2018-08-01 | End: 2018-08-03

## 2018-08-01 RX ADMIN — Medication 1000 MILLIGRAM(S): at 01:31

## 2018-08-01 RX ADMIN — Medication 100 MILLIGRAM(S): at 12:21

## 2018-08-01 RX ADMIN — Medication 1 TABLET(S): at 12:21

## 2018-08-01 RX ADMIN — Medication 1000 MILLIGRAM(S): at 02:38

## 2018-08-01 RX ADMIN — Medication 325 MILLIGRAM(S): at 18:47

## 2018-08-01 RX ADMIN — LORATADINE 10 MILLIGRAM(S): 10 TABLET ORAL at 20:52

## 2018-08-01 RX ADMIN — SODIUM CHLORIDE 75 MILLILITER(S): 9 INJECTION INTRAMUSCULAR; INTRAVENOUS; SUBCUTANEOUS at 06:19

## 2018-08-01 NOTE — H&P ADULT - HISTORY OF PRESENT ILLNESS
38yo F  5d s/p  at [facility], with PMH of Placenta previa during pregnancy, no other significant medica history came with complain of Headache, Chest pain and neck pain since 1 day. Patient states She developed Sudden Left eye pain, swelling around her eye 2 day ago, developed sudden 5/10 intensity generalized pounding Headache, severe 10/10 in intensity Neck pain which was radiating to her back 1 day ago while she was breast feeding her baby, it was severe in intensity that she was unable to look down, then 38yo F  5d s/p  at [facility], with PMH of Placenta previa during pregnancy, no other significant medical history came with complain of Headache, Chest pain and neck pain since 1 day. Patient states She developed Sudden Left eye pain, swelling around her eye 2 day ago, developed sudden 5/10 intensity generalized pounding Headache, severe 10/10 in intensity Neck pain which was radiating to her entire back 1 day ago while she was breast feeding her baby, it was severe in intensity that she was unable to look down, then since morning she developed Mid sternal 7/10 chest pain which was radiating to left shoulder aggravated by taking deep breaths and touching on chest, relieved by IV analgesics given in ED. Her symptoms are associated with nausea, chronic cough, chronic B/l leg edema, fatigue, subjective fevers, decreased lactation. She denies numbness, weakness, tingling, blurry vision, tinnitus gait disturbances, urinary or bowel complains.  One day ago, she has not slept the whole night as her baby was awake, She took ibuprofen in the morning and since then her symptoms started. She has chronic intermittent Headaches which were similar to her current headache but never developed Neck rigidity. Patient got Epidural anesthesia during delivery.     In ED, her vital signs showed BP was 133/78 later 107/45 otherwise unremarkable, CT angio chest, MRI whole spine was normal, Labs grossly normal. EKG shows NSR, No ST-T wave inversion, no DE interval depression. OBGYN evaluation recommended no OBGYN interventions currently.

## 2018-08-01 NOTE — H&P ADULT - NSHPPHYSICALEXAM_GEN_ALL_CORE
PHYSICAL EXAM:  GENERAL: In mild distress  HEAD:  Atraumatic, Normocephalic  EYES:  conjunctiva and sclera clear  NECK: Decreased ROM in all directions, Supple, No JVD, Normal thyroid  CHEST/LUNG: Left sided Chest wall tenderness on palpation,  Clear to auscultation. Clear to percussion bilaterally; No rales, rhonchi, wheezing, or rubs  HEART: Regular rate and rhythm; No murmurs, rubs, or gallops  ABDOMEN: Soft, mildly tender, Nondistended; Bowel sounds present  NERVOUS SYSTEM:  Alert & Oriented X3,  Motor Strength 5/5 in all extremities, Sensory intact to dull and sharp, Gait unable to assess as patient is in pain. Kernig and Brudzenki sign is negative  EXTREMITIES:  trace pitting edema, 2+ Peripheral Pulses, No clubbing, cyanosis,   SKIN: warm dry

## 2018-08-01 NOTE — H&P ADULT - NSHPLABSRESULTS_GEN_ALL_CORE
Vital Signs Last 24 Hrs  T(C): 37 (01 Aug 2018 05:15), Max: 37 (01 Aug 2018 05:15)  T(F): 98.6 (01 Aug 2018 05:15), Max: 98.6 (01 Aug 2018 05:15)  HR: 71 (01 Aug 2018 05:15) (67 - 88)  BP: 107/45 (01 Aug 2018 05:15) (107/45 - 133/78)  BP(mean): --  RR: 18 (01 Aug 2018 05:15) (16 - 18)  SpO2: 100% (01 Aug 2018 05:15) (98% - 100%)

## 2018-08-01 NOTE — H&P ADULT - PROBLEM SELECTOR PLAN 3
Patient is presenting with nonspecific generalized body pain including headache, neck pain, back pain likely could be severe stress/ anxiety/ s/p postpartum/ lack of sleep  MRI of spine does not shows epidural abscess or any significant pathology  c/w king

## 2018-08-01 NOTE — CONSULT NOTE ADULT - SUBJECTIVE AND OBJECTIVE BOX
CHIEF COMPLAINT:Patient is a 37y old  Female who presents with a chief complaint of Headache, Neck pain, Chest pain.      HPI:  36yo F  5d s/p  at [facility], with PMH of Placenta previa during pregnancy, no other significant medical history came with complain of Headache, Chest pain and neck pain since 1 day. Patient states She developed Sudden Left eye pain, swelling around her eye 2 day ago, developed sudden 5/10 intensity generalized pounding Headache, severe 10/10 in intensity Neck pain which was radiating to her entire back 1 day ago while she was breast feeding her baby, it was severe in intensity that she was unable to look down, then since morning she developed Mid sternal 7/10 chest pain which was radiating to left shoulder aggravated by taking deep breaths and touching on chest, relieved by IV analgesics given in ED. Her symptoms are associated with nausea, chronic cough, chronic B/l leg edema, fatigue, subjective fevers, decreased lactation. She denies numbness, weakness, tingling, blurry vision, tinnitus gait disturbances, urinary or bowel complains.  One day ago, she has not slept the whole night as her baby was awake, She took ibuprofen in the morning and since then her symptoms started. She has chronic intermittent Headaches which were similar to her current headache but never developed Neck rigidity. Patient got Epidural anesthesia during delivery.     In ED, her vital signs showed BP was 133/78 later 107/45 otherwise unremarkable, CT angio chest, MRI whole spine was normal, Labs grossly normal. EKG shows NSR, No ST-T wave inversion, no IA interval depression. OBGYN evaluation recommended no OBGYN interventions currently. (01 Aug 2018 04:44)      PAST MEDICAL & SURGICAL HISTORY:  Postpartum state      MEDICATIONS  (STANDING):  docusate sodium 100 milliGRAM(s) Oral daily  prenatal multivitamin 1 Tablet(s) Oral daily  sodium chloride 0.9%. 1000 milliLiter(s) (75 mL/Hr) IV Continuous <Continuous>    MEDICATIONS  (PRN):  acetaminophen   Tablet. 650 milliGRAM(s) Oral every 6 hours PRN Mild Pain (1 - 3)  cyclobenzaprine 5 milliGRAM(s) Oral three times a day PRN Muscle Spasm      FAMILY HISTORY:  No pertinent family history in first degree relatives      SOCIAL HISTORY:    [x ] Non-smoker    [x ] Alcohol-denies    Allergies    eggs (Swelling)  No Known Drug Allergies    Intolerances    	    REVIEW OF SYSTEMS:  CONSTITUTIONAL: No fever, weight loss, or fatigue  EYES: No eye pain, visual disturbances, or discharge  ENT:  No difficulty hearing, tinnitus, vertigo; No sinus or throat pain  NECK: No pain or stiffness  RESPIRATORY: No cough, wheezing, chills or hemoptysis; No Shortness of Breath  CARDIOVASCULAR: + chest pain, palpitations, passing out, dizziness, or leg swelling  GASTROINTESTINAL: No abdominal or epigastric pain. No nausea, vomiting, or hematemesis; No diarrhea or constipation. No melena or hematochezia.  GENITOURINARY: No dysuria, frequency, hematuria, or incontinence  NEUROLOGICAL: + headaches, No memory loss, loss of strength, numbness, or tremors  SKIN: No itching, burning, rashes, or lesions   LYMPH Nodes: No enlarged glands  ENDOCRINE: No heat or cold intolerance; No hair loss  MUSCULOSKELETAL: No joint pain or swelling; No muscle, + neck pain  PSYCHIATRIC: No depression, anxiety, mood swings, or difficulty sleeping  HEME/LYMPH: No easy bruising, or bleeding gums  ALLERGY AND IMMUNOLOGIC: No hives or eczema	      PHYSICAL EXAM:  T(C): 36.9 (18 @ 07:58), Max: 37 (18 @ 05:15)  HR: 80 (18 @ 07:58) (67 - 88)  BP: 112/47 (18 @ 07:58) (107/45 - 133/78)  RR: 18 (18 @ 07:58) (16 - 18)  SpO2: 100% (18 @ 07:58) (98% - 100%)  Wt(kg): --  I&O's Summary    2018 07:01  -  01 Aug 2018 07:00  --------------------------------------------------------  IN: 75 mL / OUT: 0 mL / NET: 75 mL        Appearance: Normal	  HEENT:   Normal oral mucosa, PERRL, EOMI	  Lymphatic: No lymphadenopathy  Cardiovascular: Normal S1 S2, No JVD, No murmurs, No edema  Respiratory: Lungs clear to auscultation	  Psychiatry: A & O x 3, Mood & affect appropriate  Gastrointestinal:  Soft, Non-tender, + BS	  Skin: No rashes, No ecchymoses, No cyanosis	  Neurologic: Non-focal  Extremities: Normal range of motion, No clubbing, cyanosis or edema  Vascular: Peripheral pulses palpable 2+ bilaterally    	    ECG:  	nsr with IRBBB    	  LABS:	 	    CARDIAC MARKERS:  CARDIAC MARKERS ( 2018 16:37 )  <0.015 ng/mL / x     / x     / x     / x                                  8.9    5.9   )-----------( 204      ( 2018 15:06 )             28.3         141  |  109<H>  |  12  ----------------------------<  90  3.8   |  24  |  0.36<L>    Ca    8.0<L>      2018 16:37    TPro  6.3  /  Alb  2.4<L>  /  TBili  0.3  /  DBili  x   /  AST  26  /  ALT  55  /  AlkPhos  122<H>      proBNP: Serum Pro-Brain Natriuretic Peptide: 35 pg/mL ( @ 16:37)        EXAM:  MR SPINE LUMBAR                            PROCEDURE DATE:  2018          INTERPRETATION:  [facility] PRELIMINARY REPORT    EXAM:    MR Lumbar Spine Without Intravenous Contrast    CLINICAL HISTORY:    37 years old, female; Pain;Low back pain    TECHNIQUE:    Magnetic resonance images of the lumbar spine without intravenous   contrast in   multiple planes.    COMPARISON:    No relevant prior studies available.    FINDINGS:    Vertebrae:  Unremarkable.  No acute fracture.    Spinal cord:  Unremarkable.  Normal signal.    Soft tissues:  Unremarkable. Presumed intrauterine pregnancy is noted   incidentally.     DISCS/SPINAL CANAL/NEURAL FORAMINA:    L1-L2:  There is moderate anterior disc narrowing with disc   desiccation.   There is a less than 1 other disc bulge seen on sagittal images. No   central or   neuroforaminal stenosis.    L2-L3:  Unremarkable.  No significant disc disease.  No stenosis.    L3-L4:  Unremarkable.  No significant disc disease.  No stenosis.    L4-L5:  There is a 2 mm focal central disc bulge/protrusion with a   small   associated annular tear. There is mild disc desiccation. There is no   central or   neuroforaminal stenosis.    L5-S1:  Unremarkable.  No significant disc disease.  No stenosis.    IMPRESSION:         L4-5: There is a 2 mm focal central disc bulge/protrusion with a small   associated annular tear. There is mild disc desiccation. There is no   central or   neuroforaminal stenosis. Additional disc narrowing, desiccation andless   than 1   mm disc bulge at L1-2. Otherwise unremarkable MRI lumbar spine. An   enlarged   uterus with heterogeneous signal in the central uterine region are   consistent   with pregnancy seen on recent obstetrical ultrasound.      Official report:    CLINICAL STATEMENT: Back pain and fever. Rule out epidural abscess    TECHNIQUE: MRI of the lumbar spine was performed without gadolinium.    COMPARISON: None.    FINDINGS:  The vertebral body heights and alignment are maintained.    The conus terminates at L1-2.    Nonspecific low T1 bone marrow signal noted which may represent red   marrow hyperplasia in the correct clinical setting    Moderate disc space narrowing L1-2 with loss of signal.    L4-5: Disc bulge with posterior annular tear and mild facet hypertrophy   noted resulting in mild spinal canal stenosis. Mild bilateral neural   foraminal narrowing.    Small sacral Tarlov cyst    Partially visualized enlarged uterus noted    IMPRESSION:  No epidural fluid collection.    Additional findings as described above.    Preliminary report provided by [facility] on 2018      EXAM:  CT ANGIO CHEST (W)AW IC                            PROCEDURE DATE:  2018          INTERPRETATION:  CLINICAL INFORMATION: Chest pain    COMPARISON: None    PROCEDURE:   CT Angiography of the Chest.  60 ml of Omnipaque 350 was injected intravenously. 40 ml were discarded.  Sagittal and coronal reformats were performed as well as MIPS.    FINDINGS:    CHEST:     LUNGS, LARGE AIRWAYS, PLEURA: Patent central airways.  No consolidation   or pneumothorax. No pleural effusion. Bibasilar atelectasis. Left lower   lobe calcified granuloma  VESSELS: No evidence of a pulmonary embolism  HEART: Heart size is normal. No pericardial effusion.  MEDIASTINUM AND STEWART: No lymphadenopathy.  CHEST WALL AND LOWER NECK: Within normal limits.  VISUALIZED UPPER ABDOMEN: Within normal limits.  BONES: Within normal limits.    IMPRESSION:     No pulmonary embolism      EXAM:  CT BRAIN                            PROCEDURE DATE:  2018          INTERPRETATION:  2018 10:58 PM    CLINICAL HISTORY:  Headache.    TECHNIQUE: Axial CT images are obtained from the cranial vertex to the   skullbase without the administration of IV contrast.    COMPARISON: None    FINDINGS:    There is no midline shift, mass effect, extra axial collection,   intracranial hemorrhage, or ventriculomegaly.    The calvarium is intact. The visualized paranasal sinuses are aerated.   The mastoid air cells are clear.    IMPRESSION:    No acute hemorrhage or mass effect

## 2018-08-01 NOTE — H&P ADULT - PROBLEM SELECTOR PLAN 2
Patient has headache with severe neck rigidity and neck pain, ROM decreased on all sides, likely due to muscle spasm  Unlikely Meningitis as patient has no fever, White count, Brudzenki and Kernig sign negative, no photophobia but patient is unable to flex her neck.  Discussed with patient regarding possibility to do LP, but patient is currently refusing.  F/u Blood cultures, Patient has headache with severe neck rigidity and neck pain, ROM decreased on all sides, likely due to muscle spasm  Unlikely Meningitis as patient has no fever, White count, Brudzenki and Kernig sign negative, no photophobia but patient is unable to flex her neck.  Discussed with patient regarding possibility to do LP, but patient is currently refusing.  F/u Blood cultures, MRI angio neck to r/o artery dissection, MRI brain Patient has headache with severe neck rigidity and neck pain, ROM decreased on all sides, likely due to muscle spasm  Unlikely Meningitis as patient has no fever, White count, Brudzenki and Kernig sign negative, no photophobia but patient is unable to flex her neck.  Discussed with patient regarding possibility to do LP, but patient is currently refusing.  F/u Blood cultures, MRI angio neck to r/o artery dissection, MRI brain ( due to concerns of Pituitory apoplexy/ pituitory hemorrhage)

## 2018-08-01 NOTE — H&P ADULT - PROBLEM SELECTOR PLAN 1
P/w sudden onset of pleuritic chest pain radiating to shoulder, relieved by IV ketorolac in ED Differential include Pleuritis, pericarditis, peripartum cardiomyopathy or rare grave causes including Coronary artery dissection  Cardiac enzyme x 1 negative  EKG NSR, no ST-T wave inversion, no IA interval depression  CT angio chest negative for Aortic dissection / PE  MOnitor on telemetry  c/w tyelnol,  F/u Echo P/w sudden onset of pleuritic chest pain radiating to shoulder, relieved by IV ketorolac in ED Differential include Pleuritis, pericarditis, peripartum cardiomyopathy or rare grave causes including Coronary artery dissection  Cardiac enzyme x 1 negative  EKG NSR, no ST-T wave inversion, no AR interval depression  CT angio chest negative for Aortic dissection / PE  MOnitor on telemetry  c/w tyelnol,  F/u Echo  Cardio Dr. Camejo

## 2018-08-01 NOTE — CONSULT NOTE ADULT - ASSESSMENT
38yo F  5d s/p  at [facility], with PMH of Placenta previa during pregnancy, no other significant medical history came with complain of Headache, Chest pain and neck pain since 1 day.  1.D/C tele.  2.Atypical cp, musculoskeletal in origin.  3.Echocardiogram.  4.Anemia-Iron.  5.Headache and neck pain-work-up as per medical attending.

## 2018-08-01 NOTE — H&P ADULT - PROBLEM SELECTOR PLAN 4
RISK                                                          Points  [  ] Previous VTE                                                3  [  ] Thrombophilia                                             2  [  ] Lower limb paralysis                                   2        (unable to hold up >15 seconds)    [  ] Current Cancer                                             2         (within 6 months)  [ x ] Immobilization > 24 hrs                              1  [  ] ICU/CCU stay > 24 hours                             1  [ x ] Age > 60                                                         1    IMPROVE VTE Score: 2  No need for VTE prophylaxis.

## 2018-08-01 NOTE — H&P ADULT - ASSESSMENT
38yo F  5d s/p  at [facility], with PMH of Placenta previa during pregnancy, no other significant medical history came with complain of Headache, Chest pain and neck pain since 1 day.       In ED, her vital signs showed BP was 133/78 later 107/45 otherwise unremarkable, CT angio chest, MRI whole spine was normal, Labs grossly normal. EKG shows NSR, No ST-T wave inversion, no OR interval depression. OBGYN evaluation recommended no OBGYN interventions currently. When patient was evaluated by me she was in mild distress, reports improvement of her chest pain, headache but was still having Neck pain    Patient is admitted to Telemetry for chest pain,

## 2018-08-02 LAB
-  AMIKACIN: SIGNIFICANT CHANGE UP
-  AMOXICILLIN/CLAVULANIC ACID: SIGNIFICANT CHANGE UP
-  AMPICILLIN/SULBACTAM: SIGNIFICANT CHANGE UP
-  AMPICILLIN: SIGNIFICANT CHANGE UP
-  AZTREONAM: SIGNIFICANT CHANGE UP
-  CEFAZOLIN: SIGNIFICANT CHANGE UP
-  CEFEPIME: SIGNIFICANT CHANGE UP
-  CEFOXITIN: SIGNIFICANT CHANGE UP
-  CEFTRIAXONE: SIGNIFICANT CHANGE UP
-  CIPROFLOXACIN: SIGNIFICANT CHANGE UP
-  ERTAPENEM: SIGNIFICANT CHANGE UP
-  GENTAMICIN: SIGNIFICANT CHANGE UP
-  LEVOFLOXACIN: SIGNIFICANT CHANGE UP
-  MEROPENEM: SIGNIFICANT CHANGE UP
-  NITROFURANTOIN: SIGNIFICANT CHANGE UP
-  PIPERACILLIN/TAZOBACTAM: SIGNIFICANT CHANGE UP
-  TOBRAMYCIN: SIGNIFICANT CHANGE UP
-  TRIMETHOPRIM/SULFAMETHOXAZOLE: SIGNIFICANT CHANGE UP
ANION GAP SERPL CALC-SCNC: 7 MMOL/L — SIGNIFICANT CHANGE UP (ref 5–17)
BASOPHILS # BLD AUTO: 0 K/UL — SIGNIFICANT CHANGE UP (ref 0–0.2)
BASOPHILS NFR BLD AUTO: 0.8 % — SIGNIFICANT CHANGE UP (ref 0–2)
BUN SERPL-MCNC: 14 MG/DL — SIGNIFICANT CHANGE UP (ref 7–18)
CALCIUM SERPL-MCNC: 8.7 MG/DL — SIGNIFICANT CHANGE UP (ref 8.4–10.5)
CHLORIDE SERPL-SCNC: 105 MMOL/L — SIGNIFICANT CHANGE UP (ref 96–108)
CO2 SERPL-SCNC: 28 MMOL/L — SIGNIFICANT CHANGE UP (ref 22–31)
CREAT SERPL-MCNC: 0.5 MG/DL — SIGNIFICANT CHANGE UP (ref 0.5–1.3)
CULTURE RESULTS: SIGNIFICANT CHANGE UP
EOSINOPHIL # BLD AUTO: 0.1 K/UL — SIGNIFICANT CHANGE UP (ref 0–0.5)
EOSINOPHIL NFR BLD AUTO: 2.3 % — SIGNIFICANT CHANGE UP (ref 0–6)
GLUCOSE SERPL-MCNC: 96 MG/DL — SIGNIFICANT CHANGE UP (ref 70–99)
HCT VFR BLD CALC: 29.7 % — LOW (ref 34.5–45)
HGB BLD-MCNC: 9.6 G/DL — LOW (ref 11.5–15.5)
INR BLD: 1.04 RATIO — SIGNIFICANT CHANGE UP (ref 0.88–1.16)
LYMPHOCYTES # BLD AUTO: 0.9 K/UL — LOW (ref 1–3.3)
LYMPHOCYTES # BLD AUTO: 17.6 % — SIGNIFICANT CHANGE UP (ref 13–44)
MAGNESIUM SERPL-MCNC: 2.1 MG/DL — SIGNIFICANT CHANGE UP (ref 1.6–2.6)
MCHC RBC-ENTMCNC: 28.6 PG — SIGNIFICANT CHANGE UP (ref 27–34)
MCHC RBC-ENTMCNC: 32.3 GM/DL — SIGNIFICANT CHANGE UP (ref 32–36)
MCV RBC AUTO: 88.7 FL — SIGNIFICANT CHANGE UP (ref 80–100)
METHOD TYPE: SIGNIFICANT CHANGE UP
MONOCYTES # BLD AUTO: 0.3 K/UL — SIGNIFICANT CHANGE UP (ref 0–0.9)
MONOCYTES NFR BLD AUTO: 6.6 % — SIGNIFICANT CHANGE UP (ref 2–14)
NEUTROPHILS # BLD AUTO: 3.7 K/UL — SIGNIFICANT CHANGE UP (ref 1.8–7.4)
NEUTROPHILS NFR BLD AUTO: 72.6 % — SIGNIFICANT CHANGE UP (ref 43–77)
ORGANISM # SPEC MICROSCOPIC CNT: SIGNIFICANT CHANGE UP
ORGANISM # SPEC MICROSCOPIC CNT: SIGNIFICANT CHANGE UP
PHOSPHATE SERPL-MCNC: 4.5 MG/DL — SIGNIFICANT CHANGE UP (ref 2.5–4.5)
PLATELET # BLD AUTO: 257 K/UL — SIGNIFICANT CHANGE UP (ref 150–400)
POTASSIUM SERPL-MCNC: 4.9 MMOL/L — SIGNIFICANT CHANGE UP (ref 3.5–5.3)
POTASSIUM SERPL-SCNC: 4.9 MMOL/L — SIGNIFICANT CHANGE UP (ref 3.5–5.3)
PROTHROM AB SERPL-ACNC: 11.4 SEC — SIGNIFICANT CHANGE UP (ref 9.8–12.7)
RBC # BLD: 3.35 M/UL — LOW (ref 3.8–5.2)
RBC # FLD: 13.9 % — SIGNIFICANT CHANGE UP (ref 10.3–14.5)
SODIUM SERPL-SCNC: 140 MMOL/L — SIGNIFICANT CHANGE UP (ref 135–145)
SPECIMEN SOURCE: SIGNIFICANT CHANGE UP
VIT B12 SERPL-MCNC: 412 PG/ML — SIGNIFICANT CHANGE UP (ref 232–1245)
WBC # BLD: 5.1 K/UL — SIGNIFICANT CHANGE UP (ref 3.8–10.5)
WBC # FLD AUTO: 5.1 K/UL — SIGNIFICANT CHANGE UP (ref 3.8–10.5)

## 2018-08-02 PROCEDURE — 70548 MR ANGIOGRAPHY NECK W/DYE: CPT | Mod: 26

## 2018-08-02 PROCEDURE — 70544 MR ANGIOGRAPHY HEAD W/O DYE: CPT | Mod: 26,59

## 2018-08-02 PROCEDURE — 70551 MRI BRAIN STEM W/O DYE: CPT | Mod: 26

## 2018-08-02 RX ADMIN — Medication 100 MILLIGRAM(S): at 11:55

## 2018-08-02 RX ADMIN — Medication 1 TABLET(S): at 11:55

## 2018-08-02 RX ADMIN — Medication 325 MILLIGRAM(S): at 06:13

## 2018-08-02 RX ADMIN — LORATADINE 10 MILLIGRAM(S): 10 TABLET ORAL at 14:34

## 2018-08-02 RX ADMIN — Medication 325 MILLIGRAM(S): at 17:31

## 2018-08-02 NOTE — PROGRESS NOTE ADULT - SUBJECTIVE AND OBJECTIVE BOX
CHIEF COMPLAINT:Patient is a 37y old  Female who presents with a chief complaint of Headache, Neck pain, Chest pain .Pt appears comfortable.    	  REVIEW OF SYSTEMS:  CONSTITUTIONAL: No fever, weight loss, or fatigue  EYES: No eye pain, visual disturbances, or discharge  ENT:  No difficulty hearing, tinnitus, vertigo; No sinus or throat pain  NECK: No pain or stiffness  RESPIRATORY: No cough, wheezing, chills or hemoptysis; No Shortness of Breath  CARDIOVASCULAR: No chest pain, palpitations, passing out, dizziness, or leg swelling  GASTROINTESTINAL: No abdominal or epigastric pain. No nausea, vomiting, or hematemesis; No diarrhea or constipation. No melena or hematochezia.  GENITOURINARY: No dysuria, frequency, hematuria, or incontinence  NEUROLOGICAL: No headaches, memory loss, loss of strength, numbness, or tremors  SKIN: No itching, burning, rashes, or lesions   LYMPH Nodes: No enlarged glands  ENDOCRINE: No heat or cold intolerance; No hair loss  MUSCULOSKELETAL: No joint pain or swelling; No muscle, back, or extremity pain  PSYCHIATRIC: No depression, anxiety, mood swings, or difficulty sleeping  HEME/LYMPH: No easy bruising, or bleeding gums  ALLERGY AND IMMUNOLOGIC: No hives or eczema	      PHYSICAL EXAM:  T(C): 37.2 (08-02-18 @ 07:25), Max: 37.5 (08-01-18 @ 23:40)  HR: 82 (08-02-18 @ 07:25) (63 - 106)  BP: 106/52 (08-02-18 @ 07:25) (100/42 - 120/51)  RR: 16 (08-02-18 @ 07:25) (16 - 18)  SpO2: 99% (08-02-18 @ 07:25) (99% - 100%)    Appearance: Normal	  HEENT:   Normal oral mucosa, PERRL, EOMI	  Lymphatic: No lymphadenopathy  Cardiovascular: Normal S1 S2, No JVD, No murmurs, No edema  Respiratory: Lungs clear to auscultation	  Psychiatry: A & O x 3, Mood & affect appropriate  Gastrointestinal:  Soft, Non-tender, + BS	  Skin: No rashes, No ecchymoses, No cyanosis	  Neurologic: Non-focal  Extremities: Normal range of motion, No clubbing, cyanosis or edema  Vascular: Peripheral pulses palpable 2+ bilaterally    MEDICATIONS  (STANDING):  docusate sodium 100 milliGRAM(s) Oral daily  ferrous    sulfate 325 milliGRAM(s) Oral two times a day  loratadine 10 milliGRAM(s) Oral daily  prenatal multivitamin 1 Tablet(s) Oral daily  sodium chloride 0.9%. 1000 milliLiter(s) (75 mL/Hr) IV Continuous <Continuous>      LABS:	 	    CARDIAC MARKERS:  CARDIAC MARKERS ( 01 Aug 2018 11:23 )  <0.015 ng/mL / x     / 46 U/L / x     / <1.0 ng/mL  CARDIAC MARKERS ( 31 Jul 2018 16:37 )  <0.015 ng/mL / x     / x     / x     / x                                    9.1    6.1   )-----------( 218      ( 01 Aug 2018 18:14 )             28.2     08-01    139  |  107  |  14  ----------------------------<  107<H>  4.1   |  25  |  0.63    Ca    8.5      01 Aug 2018 18:14  Phos  3.9     08-01  Mg     2.0     08-01    TPro  6.3  /  Alb  2.4<L>  /  TBili  0.3  /  DBili  x   /  AST  26  /  ALT  55  /  AlkPhos  122<H>  07-31    proBNP: Serum Pro-Brain Natriuretic Peptide: 35 pg/mL (07-31 @ 16:37)    Lipid Profile: Cholesterol 241    HDL 61      HgA1c: Hemoglobin A1C, Whole Blood: 4.9 % (08-01 @ 18:12)    TSH: Thyroid Stimulating Hormone, Serum: 1.38 uU/mL (08-01 @ 11:23)

## 2018-08-02 NOTE — PROGRESS NOTE ADULT - PROBLEM SELECTOR PLAN 1
P/w sudden onset of pleuritic chest pain radiating to shoulder, relieved by IV ketorolac in ED Differential include Pleuritis, pericarditis, peripartum cardiomyopathy or rare grave causes including Coronary artery dissection  Cardiac enzyme x 1 negative  EKG NSR, no ST-T wave inversion, no MD interval depression  CT angio chest negative for Aortic dissection / PE  MOnitor on telemetry  c/w tyelnol,  F/u Echo  Cardio Dr. Camejo P/w sudden onset of pleuritic chest pain radiating to shoulder, relieved by IV ketorolac in ED Differential include Pleuritis, pericarditis, peripartum cardiomyopathy or rare grave causes including Coronary artery dissection  Cardiac enzyme x 3 negative  EKG NSR, no ST-T wave inversion, no CT interval depression  CT angio chest negative for Aortic dissection / PE  Echo : WNL

## 2018-08-02 NOTE — PROGRESS NOTE ADULT - ASSESSMENT
36yo F  5d s/p  at [facility], with PMH of Placenta previa during pregnancy, no other significant medical history came with complain of Headache, Chest pain and neck pain since 1 day.  1.Atypical cp, musculoskeletal in origin.  2.Echocardiogram.  3.Anemia-Iron.  4.Headache and neck pain-work-up as per medical attending.

## 2018-08-02 NOTE — PROGRESS NOTE ADULT - SUBJECTIVE AND OBJECTIVE BOX
PGY1 Note discussed with Supervising Resident and Primary Attending.    Patient is a 37y old  Female who presents with a chief complaint of Headache, Neck pain, Chest pain (01 Aug 2018 04:44)      INTERVAL HPI/OVERNIGHT EVENTS :    MEDICATIONS  (STANDING):  docusate sodium 100 milliGRAM(s) Oral daily  ferrous    sulfate 325 milliGRAM(s) Oral two times a day  loratadine 10 milliGRAM(s) Oral daily  prenatal multivitamin 1 Tablet(s) Oral daily  sodium chloride 0.9%. 1000 milliLiter(s) (75 mL/Hr) IV Continuous <Continuous>    MEDICATIONS  (PRN):  acetaminophen   Tablet. 650 milliGRAM(s) Oral every 6 hours PRN Mild Pain (1 - 3)  cyclobenzaprine 5 milliGRAM(s) Oral three times a day PRN Muscle Spasm      Allergies    eggs (Swelling)  No Known Drug Allergies    Intolerances        REVIEW OF SYSTEMS :  * CONSTITUTIONAL      : No Fever, Weight loss, or Fatigue  * EYES                             : No eye pain , Visual disturbances or Discharge  * RESPIRATORY             : No Cough, Wheezing, Chills or Hemoptysis; No shortness of breath  * CARDIOVASCULAR     : No Chest pain, Palpitations, Dizziness, or Leg swelling  * GASTROINTESTINAL  : No Abdominal or Epigastric pain. No Nausea, Vomiting or Hematemesis; No Diarrhea or Constipation. No Melena or Hematochezia.  * GENITOURINARY        : No Dysuria , Frequency , Haematuria   * NEUROLOGICAL          : No Headaches, Memory loss, Loss of trength, Numbness, or Tremors  * MUSCULOSKELETAL   : No Joint pain  * PSYCHIATRY                 : No Depression or Anxiety   * HEME/LYMPH              : No Easy Bruising or Bleeding gums  * SKIN                               : No Itching, Burning, Rashes, or Lesions     Vital Signs Last 24 Hrs  T(C): 36.7 (02 Aug 2018 11:19), Max: 37.5 (01 Aug 2018 23:40)  T(F): 98.1 (02 Aug 2018 11:19), Max: 99.5 (01 Aug 2018 23:40)  HR: 77 (02 Aug 2018 11:19) (77 - 106)  BP: 104/56 (02 Aug 2018 11:19) (102/42 - 120/51)  BP(mean): --  RR: 16 (02 Aug 2018 11:19) (16 - 18)  SpO2: 100% (02 Aug 2018 11:19) (99% - 100%)    PHYSICAL EXAM :  * GENERAL                 : NAD, Well-groomed, Well-developed  * HEAD                       :  Atraumatic, Normocephalic  * EYES                         : EOMI, PERRLA, Conjunctiva and Sclera clear  * ENT                           : Moist Mucous Membranes  * NECK                         : Supple, No JVD, Normal Thyroid  * CHEST/LUNG           : Clear to Auscultation bilaterally; No Rales, Rhonchi, Wheezing or Rubs  * HEART                       : Regular Rate and Rhythm; No murmurs, Rubs or gallops  * ABDOMEN                : Soft, Non-tender, Non-distended; Bowel Sounds present  * NERVOUS SYSTEM  :  Alert & Oriented X3, Good Concentration; Motor Strength 5/5 B/L UL LL ; DTRs 2+ Intact and Symmetric  * EXTREMITIES            :  2+ Peripheral Pulses, No clubbing, cyanosis, or edema  * SKIN                           : No Rashes or Lesions    LABS:                          9.1    6.1   )-----------( 218      ( 01 Aug 2018 18:14 )             28.2     08-    139  |  107  |  14  ----------------------------<  107<H>  4.1   |  25  |  0.63    Ca    8.5      01 Aug 2018 18:14  Phos  3.9     08-  Mg     2.0     08-01    TPro  6.3  /  Alb  2.4<L>  /  TBili  0.3  /  DBili  x   /  AST  26  /  ALT  55  /  AlkPhos  122<H>  07-31    PT/INR - ( 01 Aug 2018 18:14 )   PT: 11.2 sec;   INR: 1.03 ratio           Urinalysis Basic - ( 2018 17:11 )    Color: Pink / Appearance: Hazy / S.015 / pH: x  Gluc: x / Ketone: Negative  / Bili: Negative / Urobili: Negative   Blood: x / Protein: 100 / Nitrite: Negative   Leuk Esterase: Moderate / RBC: >50 /HPF / WBC 11-25 /HPF   Sq Epi: x / Non Sq Epi: Few /HPF / Bacteria: Few /HPF      CAPILLARY BLOOD GLUCOSE          RADIOLOGY & ADDITIONAL TESTS:   No radiological imaging was required    Imaging Personally Reviewed   :  [ ] YES  [ ] NO    Consultant(s) Notes Reviewed :  [ ] YES  [ ] NO PGY1 Note discussed with Supervising Resident and Primary Attending.    Patient is a 37y old  Female who presents with a chief complaint of Headache, Neck pain, Chest pain (01 Aug 2018 04:44)      INTERVAL HPI/OVERNIGHT EVENTS : foul smell odor from vagina , neck pain improved    MEDICATIONS  (STANDING):  docusate sodium 100 milliGRAM(s) Oral daily  ferrous    sulfate 325 milliGRAM(s) Oral two times a day  loratadine 10 milliGRAM(s) Oral daily  prenatal multivitamin 1 Tablet(s) Oral daily  sodium chloride 0.9%. 1000 milliLiter(s) (75 mL/Hr) IV Continuous <Continuous>    MEDICATIONS  (PRN):  acetaminophen   Tablet. 650 milliGRAM(s) Oral every 6 hours PRN Mild Pain (1 - 3)  cyclobenzaprine 5 milliGRAM(s) Oral three times a day PRN Muscle Spasm      Allergies    eggs (Swelling)  No Known Drug Allergies    Intolerances        REVIEW OF SYSTEMS :  * CONSTITUTIONAL      : No Fever, Weight loss, or Fatigue  * EYES                             : No eye pain , Visual disturbances or Discharge  * RESPIRATORY             : No Cough, Wheezing, Chills or Hemoptysis; No shortness of breath  * CARDIOVASCULAR     : No Chest pain, Palpitations, Dizziness, or Leg swelling  * GASTROINTESTINAL  : No Abdominal or Epigastric pain. No Nausea, Vomiting or Hematemesis; No Diarrhea or Constipation. No Melena or Hematochezia.  * GENITOURINARY        : No Dysuria , Frequency , Haematuria   * NEUROLOGICAL          : No Headaches, Memory loss, Loss of trength, Numbness, or Tremors  * MUSCULOSKELETAL   : No Joint pain  * PSYCHIATRY                 : No Depression or Anxiety   * HEME/LYMPH              : No Easy Bruising or Bleeding gums  * SKIN                               : No Itching, Burning, Rashes, or Lesions     Vital Signs Last 24 Hrs  T(C): 36.7 (02 Aug 2018 11:19), Max: 37.5 (01 Aug 2018 23:40)  T(F): 98.1 (02 Aug 2018 11:19), Max: 99.5 (01 Aug 2018 23:40)  HR: 77 (02 Aug 2018 11:19) (77 - 106)  BP: 104/56 (02 Aug 2018 11:19) (102/42 - 120/51)  BP(mean): --  RR: 16 (02 Aug 2018 11:19) (16 - 18)  SpO2: 100% (02 Aug 2018 11:19) (99% - 100%)    PHYSICAL EXAM :  * GENERAL                 : NAD, Well-groomed, Well-developed  * HEAD                       :  Atraumatic, Normocephalic  * EYES                         : EOMI, PERRLA, Conjunctiva and Sclera clear  * ENT                           : Moist Mucous Membranes  * NECK                         : Supple, No JVD, Normal Thyroid  * CHEST/LUNG           : Clear to Auscultation bilaterally; No Rales, Rhonchi, Wheezing or Rubs  * HEART                       : Regular Rate and Rhythm; No murmurs, Rubs or gallops  * ABDOMEN                : Soft, Non-tender, Non-distended; Bowel Sounds present  * NERVOUS SYSTEM  :  Alert & Oriented X3, Good Concentration; Motor Strength 5/5 B/L UL LL ; DTRs 2+ Intact and Symmetric  * EXTREMITIES            :  2+ Peripheral Pulses, No clubbing, cyanosis, or edema  * SKIN                           : No Rashes or Lesions    LABS:                          9.1    6.1   )-----------( 218      ( 01 Aug 2018 18:14 )             28.2     08-    139  |  107  |  14  ----------------------------<  107<H>  4.1   |  25  |  0.63    Ca    8.5      01 Aug 2018 18:14  Phos  3.9     -  Mg     2.0     -    TPro  6.3  /  Alb  2.4<L>  /  TBili  0.3  /  DBili  x   /  AST  26  /  ALT  55  /  AlkPhos  122<H>  07-31    PT/INR - ( 01 Aug 2018 18:14 )   PT: 11.2 sec;   INR: 1.03 ratio           Urinalysis Basic - ( 2018 17:11 )    Color: Pink / Appearance: Hazy / S.015 / pH: x  Gluc: x / Ketone: Negative  / Bili: Negative / Urobili: Negative   Blood: x / Protein: 100 / Nitrite: Negative   Leuk Esterase: Moderate / RBC: >50 /HPF / WBC 11-25 /HPF   Sq Epi: x / Non Sq Epi: Few /HPF / Bacteria: Few /HPF      CAPILLARY BLOOD GLUCOSE          RADIOLOGY & ADDITIONAL TESTS:   No radiological imaging was required    Imaging Personally Reviewed   :  [ ] YES  [ ] NO    Consultant(s) Notes Reviewed :  [ ] YES  [ ] NO

## 2018-08-02 NOTE — PROGRESS NOTE ADULT - ASSESSMENT
38yo F  5d s/p  at [facility], with PMH of Placenta previa during pregnancy, no other significant medical history came with complain of Headache, Chest pain and neck pain since 1 day.       In ED, her vital signs showed BP was 133/78 later 107/45 otherwise unremarkable, CT angio chest, MRI whole spine was normal, Labs grossly normal. EKG shows NSR, No ST-T wave inversion, no OH interval depression. OBGYN evaluation recommended no OBGYN interventions currently. When patient was evaluated by me she was in mild distress, reports improvement of her chest pain, headache but was still having Neck pain    Patient is admitted to Telemetry for chest pain, 38yo F  5d s/p  at [facility], with PMH of Placenta previa during pregnancy, no other significant medical history came with complain of Headache, Chest pain and neck pain since 1 day.       In ED, her vital signs showed BP was 133/78 later 107/45 otherwise unremarkable, CT angio chest, MRI whole spine was normal, Labs grossly normal. EKG shows NSR, No ST-T wave inversion, no VT interval depression. OBGYN evaluation recommended no OBGYN interventions currently. When patient was evaluated by me she was in mild distress, reports improvement of her chest pain, headache but was still having Neck pain    Patient is admitted to Telemetry for chest pain,     Echo : WNL   foul smell odor from vagina , neck pain improved  Gyn was consulted

## 2018-08-02 NOTE — CHART NOTE - NSCHARTNOTEFT_GEN_A_CORE
called to reevaluate pt c/o foul vaginal oder. pt reports minimal bleeding. Ambulating and voiding without difficulty.  Passing flatus and tolerating regular diet.  pumping. Denies HA, CP, SOB, N/V/D, dizziness, palpitations, worsening abdominal pain, worsening vaginal bleeding, or any other concerns.      Vital Signs Last 24 Hrs  T(C): 37 (02 Aug 2018 19:54), Max: 37.5 (01 Aug 2018 23:40)  T(F): 98.6 (02 Aug 2018 19:54), Max: 99.5 (01 Aug 2018 23:40)  HR: 90 (02 Aug 2018 19:54) (77 - 98)  BP: 112/53 (02 Aug 2018 19:54) (104/56 - 120/51)  BP(mean): --  RR: 17 (02 Aug 2018 19:54) (16 - 18)  SpO2: 100% (02 Aug 2018 19:54) (99% - 100%)    Physical Exam:     Gen: A&Ox 3, NAD  Chest: CTA B/L  Cardiac: S1,S2; RRR  Breast: Soft, nontender, nonengorged  Abdomen: +BS; Soft, nontender, ND; Fundus firm below umbilicus  Gyn: Min lochia; bimanual exam- no odor, no tenderness  Ext: Nontender, DTRS 2+, no worsening edema                          9.6    5.1   )-----------( 257      ( 02 Aug 2018 19:32 )             29.7        A/P:  PPD#7 hd #3  s/p  with acute blood loss anemia   vaginal culture taken  pt instructed to take iron and colace  - Discharge home with instructions  -Follow up in office in 1 week for postpartum visit   d/w dr. noemi soriano

## 2018-08-02 NOTE — PROGRESS NOTE ADULT - PROBLEM SELECTOR PLAN 2
Patient has headache with severe neck rigidity and neck pain, ROM decreased on all sides, likely due to muscle spasm  Unlikely Meningitis as patient has no fever, White count, Brudzenki and Kernig sign negative, no photophobia but patient is unable to flex her neck.  Discussed with patient regarding possibility to do LP, but patient is currently refusing.  F/u Blood cultures, MRI angio neck to r/o artery dissection, MRI brain ( due to concerns of Pituitory apoplexy/ pituitory hemorrhage)

## 2018-08-03 ENCOUNTER — TRANSCRIPTION ENCOUNTER (OUTPATIENT)
Age: 38
End: 2018-08-03

## 2018-08-03 VITALS
DIASTOLIC BLOOD PRESSURE: 48 MMHG | RESPIRATION RATE: 18 BRPM | TEMPERATURE: 98 F | OXYGEN SATURATION: 100 % | HEART RATE: 99 BPM | SYSTOLIC BLOOD PRESSURE: 95 MMHG

## 2018-08-03 PROCEDURE — 96374 THER/PROPH/DIAG INJ IV PUSH: CPT

## 2018-08-03 PROCEDURE — 99291 CRITICAL CARE FIRST HOUR: CPT | Mod: 25

## 2018-08-03 PROCEDURE — 72148 MRI LUMBAR SPINE W/O DYE: CPT

## 2018-08-03 PROCEDURE — 82550 ASSAY OF CK (CPK): CPT

## 2018-08-03 PROCEDURE — 82607 VITAMIN B-12: CPT

## 2018-08-03 PROCEDURE — 83605 ASSAY OF LACTIC ACID: CPT

## 2018-08-03 PROCEDURE — 80053 COMPREHEN METABOLIC PANEL: CPT

## 2018-08-03 PROCEDURE — 93005 ELECTROCARDIOGRAM TRACING: CPT

## 2018-08-03 PROCEDURE — 93306 TTE W/DOPPLER COMPLETE: CPT

## 2018-08-03 PROCEDURE — 71275 CT ANGIOGRAPHY CHEST: CPT

## 2018-08-03 PROCEDURE — 70450 CT HEAD/BRAIN W/O DYE: CPT

## 2018-08-03 PROCEDURE — 85652 RBC SED RATE AUTOMATED: CPT

## 2018-08-03 PROCEDURE — 87070 CULTURE OTHR SPECIMN AEROBIC: CPT

## 2018-08-03 PROCEDURE — 86140 C-REACTIVE PROTEIN: CPT

## 2018-08-03 PROCEDURE — 70544 MR ANGIOGRAPHY HEAD W/O DYE: CPT

## 2018-08-03 PROCEDURE — 85027 COMPLETE CBC AUTOMATED: CPT

## 2018-08-03 PROCEDURE — 87086 URINE CULTURE/COLONY COUNT: CPT

## 2018-08-03 PROCEDURE — 83880 ASSAY OF NATRIURETIC PEPTIDE: CPT

## 2018-08-03 PROCEDURE — 83735 ASSAY OF MAGNESIUM: CPT

## 2018-08-03 PROCEDURE — 72146 MRI CHEST SPINE W/O DYE: CPT

## 2018-08-03 PROCEDURE — 84443 ASSAY THYROID STIM HORMONE: CPT

## 2018-08-03 PROCEDURE — 80061 LIPID PANEL: CPT

## 2018-08-03 PROCEDURE — 84484 ASSAY OF TROPONIN QUANT: CPT

## 2018-08-03 PROCEDURE — 87186 SC STD MICRODIL/AGAR DIL: CPT

## 2018-08-03 PROCEDURE — 82553 CREATINE MB FRACTION: CPT

## 2018-08-03 PROCEDURE — 85610 PROTHROMBIN TIME: CPT

## 2018-08-03 PROCEDURE — 70551 MRI BRAIN STEM W/O DYE: CPT

## 2018-08-03 PROCEDURE — 72141 MRI NECK SPINE W/O DYE: CPT

## 2018-08-03 PROCEDURE — 84100 ASSAY OF PHOSPHORUS: CPT

## 2018-08-03 PROCEDURE — 80048 BASIC METABOLIC PNL TOTAL CA: CPT

## 2018-08-03 PROCEDURE — 70548 MR ANGIOGRAPHY NECK W/DYE: CPT

## 2018-08-03 PROCEDURE — 83036 HEMOGLOBIN GLYCOSYLATED A1C: CPT

## 2018-08-03 PROCEDURE — 87040 BLOOD CULTURE FOR BACTERIA: CPT

## 2018-08-03 PROCEDURE — 81001 URINALYSIS AUTO W/SCOPE: CPT

## 2018-08-03 PROCEDURE — 96375 TX/PRO/DX INJ NEW DRUG ADDON: CPT

## 2018-08-03 RX ORDER — FERROUS SULFATE 325(65) MG
1 TABLET ORAL
Qty: 60 | Refills: 0 | OUTPATIENT
Start: 2018-08-03 | End: 2018-09-01

## 2018-08-03 RX ORDER — CYCLOBENZAPRINE HYDROCHLORIDE 10 MG/1
1 TABLET, FILM COATED ORAL
Qty: 21 | Refills: 0 | OUTPATIENT
Start: 2018-08-03 | End: 2018-08-09

## 2018-08-03 RX ADMIN — LORATADINE 10 MILLIGRAM(S): 10 TABLET ORAL at 12:42

## 2018-08-03 RX ADMIN — Medication 1 TABLET(S): at 12:43

## 2018-08-03 RX ADMIN — Medication 100 MILLIGRAM(S): at 12:43

## 2018-08-03 RX ADMIN — Medication 325 MILLIGRAM(S): at 06:43

## 2018-08-03 NOTE — DISCHARGE NOTE ADULT - PATIENT PORTAL LINK FT
You can access the [facility] Patient Portal, offered by [facility], by registering with the following website: [email or web address]

## 2018-08-03 NOTE — DISCHARGE NOTE ADULT - HOSPITAL COURSE
36yo F  5d s/p  at [facility], with PMH of Placenta previa during pregnancy, no other significant medical history came with complain of Headache, Chest pain and neck pain. chest pain was atypical and tele was discontinued at admission, Dr Cameoj saw the patient, TTE was WNL. patient got a number of imaging studies including MR of cervical, thoracic and Lumbar spine that was neg, MR brain and MRI head and neck were negative too. CTA chest was negative for PE. patient to be discharged on PRN NSAIDS and flexeryl for 1 week. Patient stable for discharge to home at this time

## 2018-08-03 NOTE — PROGRESS NOTE ADULT - SUBJECTIVE AND OBJECTIVE BOX
CHIEF COMPLAINT:Patient is a 37y old  Female who presents with a chief complaint of Headache, Neck pain, Chest pain.Pt feeling better.    	  REVIEW OF SYSTEMS:  CONSTITUTIONAL: No fever, weight loss, or fatigue  EYES: No eye pain, visual disturbances, or discharge  ENT:  No difficulty hearing, tinnitus, vertigo; No sinus or throat pain  NECK: No pain or stiffness  RESPIRATORY: No cough, wheezing, chills or hemoptysis; No Shortness of Breath  CARDIOVASCULAR: No chest pain, palpitations, passing out, dizziness, or leg swelling  GASTROINTESTINAL: No abdominal or epigastric pain. No nausea, vomiting, or hematemesis; No diarrhea or constipation. No melena or hematochezia.  GENITOURINARY: No dysuria, frequency, hematuria, or incontinence  NEUROLOGICAL: No headaches, memory loss, loss of strength, numbness, or tremors  SKIN: No itching, burning, rashes, or lesions   LYMPH Nodes: No enlarged glands  ENDOCRINE: No heat or cold intolerance; No hair loss  MUSCULOSKELETAL: No joint pain or swelling; No muscle, back, or extremity pain  PSYCHIATRIC: No depression, anxiety, mood swings, or difficulty sleeping  HEME/LYMPH: No easy bruising, or bleeding gums  ALLERGY AND IMMUNOLOGIC: No hives or eczema	      PHYSICAL EXAM:  T(C): 36.8 (08-03-18 @ 06:45), Max: 37 (08-02-18 @ 19:54)  HR: 84 (08-03-18 @ 06:45) (77 - 96)  BP: 113/53 (08-03-18 @ 06:45) (104/47 - 113/53)  RR: 18 (08-03-18 @ 06:45) (16 - 18)  SpO2: 100% (08-03-18 @ 06:45) (100% - 100%)  Wt(kg): --  I&O's Summary    02 Aug 2018 07:01  -  03 Aug 2018 07:00  --------------------------------------------------------  IN: 250 mL / OUT: 0 mL / NET: 250 mL        Appearance: Normal	  HEENT:   Normal oral mucosa, PERRL, EOMI	  Lymphatic: No lymphadenopathy  Cardiovascular: Normal S1 S2, No JVD, No murmurs, No edema  Respiratory: Lungs clear to auscultation	  Psychiatry: A & O x 3, Mood & affect appropriate  Gastrointestinal:  Soft, Non-tender, + BS	  Skin: No rashes, No ecchymoses, No cyanosis	  Neurologic: Non-focal  Extremities: Normal range of motion, No clubbing, cyanosis or edema  Vascular: Peripheral pulses palpable 2+ bilaterally    MEDICATIONS  (STANDING):  docusate sodium 100 milliGRAM(s) Oral daily  ferrous    sulfate 325 milliGRAM(s) Oral two times a day  loratadine 10 milliGRAM(s) Oral daily  prenatal multivitamin 1 Tablet(s) Oral daily  sodium chloride 0.9%. 1000 milliLiter(s) (75 mL/Hr) IV Continuous <Continuous>      	  LABS:	 	    CARDIAC MARKERS:  CARDIAC MARKERS ( 01 Aug 2018 11:23 )  <0.015 ng/mL / x     / 46 U/L / x     / <1.0 ng/mL                         9.6    5.1   )-----------( 257      ( 02 Aug 2018 19:32 )             29.7     08-02    140  |  105  |  14  ----------------------------<  96  4.9   |  28  |  0.50    Ca    8.7      02 Aug 2018 19:32  Phos  4.5     08-02  Mg     2.1     08-02      proBNP: Serum Pro-Brain Natriuretic Peptide: 35 pg/mL (07-31 @ 16:37)    Lipid Profile: Cholesterol 241    HDL 61      HgA1c: Hemoglobin A1C, Whole Blood: 4.9 % (08-01 @ 18:12)    TSH: Thyroid Stimulating Hormone, Serum: 1.38 uU/mL (08-01 @ 11:23)      MRI-.    MRA-.    OBSERVATIONS:  Mitral Valve: Normal mitral valve.  Aortic Root: Normal aortic root.  Aortic Valve: Aortic valve leaflet morphology not well  visualized, opens normally.  Left Atrium: Normal left atrium.  LA volume index = 23  cc/m2.  Left Ventricle: Normal left ventricular function. Normal  left ventricular internal dimensions and wall thicknesses.  Right Heart: Normal right atrium. Normal right ventricular  size and function. There is trace tricuspid regurgitation.  Pericardium/PleuraNormal pericardium with no pericardial  effusion.  Hemodynamic: RA Pressure is 10 mm Hg. RV systolic pressure  is 23 mm Hg.

## 2018-08-03 NOTE — DISCHARGE NOTE ADULT - CARE PLAN
Principal Discharge DX:	Neck pain  Goal:	resolution  Assessment and plan of treatment:	you came in with neck pain which was being evaluated in the hospital and got a number of imaging testes including, MRA head and neck, MRI brain and MRI of cervical, thoracic and lumbar spine, however these did not show any acute changes. will recommend continuing as needed ibuprofen and flexeril for muscle spasms  Secondary Diagnosis:	Chest pain, unspecified type  Assessment and plan of treatment:	atypical chest pain, not cardiac in etiology

## 2018-08-03 NOTE — DISCHARGE NOTE ADULT - MEDICATION SUMMARY - MEDICATIONS TO TAKE
I will START or STAY ON the medications listed below when I get home from the hospital:    ibuprofen 600 mg oral tablet  -- 1 tab(s) by mouth every 6 hours, As Needed   -- Indication: For Pain    acetaminophen 325 mg oral tablet  -- 3 tab(s) by mouth every 6 hours  -- Indication: For Pain    Prenatal Multivitamins with Folic Acid 1 mg oral tablet  -- 1 tab(s) by mouth once a day  -- Indication: For supplement    FeroSul 325 mg (65 mg elemental iron) oral tablet  -- 1 tab(s) by mouth 2 times a day   -- Check with your doctor before becoming pregnant.  Do not chew, break, or crush.  May discolor urine or feces.    -- Indication: For Anemia    Colace 100 mg oral capsule  -- 1 cap(s) by mouth 2 times a day, As Needed   -- Medication should be taken with plenty of water.    -- Indication: For Constipation    cyclobenzaprine 5 mg oral tablet  -- 1 tab(s) by mouth 3 times a day, As needed, Muscle Spasm  -- Indication: For Muscle spasms    norethindrone 0.35 mg oral tablet  -- 1 tab(s) by mouth once a day. Begin Sunday after the delivery.  -- Do not take this drug if you are pregnant.  It is very important that you take or use this exactly as directed.  Do not skip doses or discontinue unless directed by your doctor.    -- Indication: For hormone

## 2018-08-03 NOTE — PROGRESS NOTE ADULT - ASSESSMENT
38yo F  5d s/p  at [facility], with PMH of Placenta previa during pregnancy, no other significant medical history came with complain of Headache, Chest pain and neck pain since 1 day.  1.Atypical cp, musculoskeletal in origin.  2.Echocardiogram.  3.Anemia-Iron.  4.Headache and neck pain-work-up as per medical attending.

## 2018-08-03 NOTE — DISCHARGE NOTE ADULT - NS MD DC PLAN IMMU FLU REF OTH
"Anesthesia Transfer of Care Note    Patient: Qian Zpaata    Procedure(s) Performed: Procedure(s) (LRB):  COLONOSCOPY (N/A)    Patient location: PACU    Anesthesia Type: general    Transport from OR: Transported from OR on 6-10 L/min O2 by face mask with adequate spontaneous ventilation    Post pain: adequate analgesia    Post assessment: no apparent anesthetic complications and tolerated procedure well    Post vital signs: stable    Level of consciousness: sedated    Nausea/Vomiting: no nausea/vomiting    Complications: none    Transfer of care protocol was followed      Last vitals:   Visit Vitals  /69   Pulse 69   Temp 36.7 °C (98.1 °F) (Oral)   Resp 16   Ht 5' 5" (1.651 m)   Wt 94.8 kg (209 lb)   LMP 07/19/2014   SpO2 95%   BMI 34.78 kg/m²     "
Out of season

## 2018-08-03 NOTE — DISCHARGE NOTE ADULT - CARE PROVIDER_API CALL
Chandan Murillo), Internal Medicine  [address] [place] [id number]  Phone: [phone]  Fax: [phone]

## 2018-08-03 NOTE — DISCHARGE NOTE ADULT - PLAN OF CARE
resolution you came in with neck pain which was being evaluated in the hospital and got a number of imaging testes including, MRA head and neck, MRI brain and MRI of cervical, thoracic and lumbar spine, however these did not show any acute changes. will recommend continuing as needed ibuprofen and flexeril for muscle spasms atypical chest pain, not cardiac in etiology

## 2018-08-05 LAB
CULTURE RESULTS: SIGNIFICANT CHANGE UP
CULTURE RESULTS: SIGNIFICANT CHANGE UP
SPECIMEN SOURCE: SIGNIFICANT CHANGE UP
SPECIMEN SOURCE: SIGNIFICANT CHANGE UP

## 2018-08-09 ENCOUNTER — TRANSCRIPTION ENCOUNTER (OUTPATIENT)
Age: 38
End: 2018-08-09

## 2018-08-10 ENCOUNTER — INPATIENT (INPATIENT)
Facility: HOSPITAL | Age: 38
LOS: 3 days | Discharge: HOME | DRG: 769 | End: 2018-08-14
Attending: OBSTETRICS & GYNECOLOGY | Admitting: OBSTETRICS & GYNECOLOGY
Payer: MEDICAID

## 2018-08-10 VITALS
SYSTOLIC BLOOD PRESSURE: 113 MMHG | TEMPERATURE: 102 F | RESPIRATION RATE: 24 BRPM | OXYGEN SATURATION: 99 % | WEIGHT: 139.99 LBS | DIASTOLIC BLOOD PRESSURE: 72 MMHG | HEART RATE: 135 BPM

## 2018-08-10 DIAGNOSIS — A41.9 SEPSIS, UNSPECIFIED ORGANISM: ICD-10-CM

## 2018-08-10 DIAGNOSIS — Z98.890 OTHER SPECIFIED POSTPROCEDURAL STATES: Chronic | ICD-10-CM

## 2018-08-10 LAB
ALBUMIN SERPL ELPH-MCNC: 3.7 G/DL — SIGNIFICANT CHANGE UP (ref 3.3–5)
ALP SERPL-CCNC: 97 U/L — SIGNIFICANT CHANGE UP (ref 40–120)
ALT FLD-CCNC: 20 U/L — SIGNIFICANT CHANGE UP (ref 10–45)
ANION GAP SERPL CALC-SCNC: 16 MMOL/L — SIGNIFICANT CHANGE UP (ref 5–17)
APPEARANCE UR: ABNORMAL
APTT BLD: 26.7 SEC — LOW (ref 27.5–37.4)
APTT BLD: 26.9 SEC — LOW (ref 27.5–37.4)
AST SERPL-CCNC: 14 U/L — SIGNIFICANT CHANGE UP (ref 10–40)
BASOPHILS # BLD AUTO: 0 K/UL — SIGNIFICANT CHANGE UP (ref 0–0.2)
BASOPHILS NFR BLD AUTO: 0.1 % — SIGNIFICANT CHANGE UP (ref 0–2)
BASOPHILS NFR BLD AUTO: 0.1 % — SIGNIFICANT CHANGE UP (ref 0–2)
BASOPHILS NFR BLD AUTO: 0.2 % — SIGNIFICANT CHANGE UP (ref 0–2)
BILIRUB SERPL-MCNC: 0.9 MG/DL — SIGNIFICANT CHANGE UP (ref 0.2–1.2)
BILIRUB UR-MCNC: NEGATIVE — SIGNIFICANT CHANGE UP
BLD GP AB SCN SERPL QL: NEGATIVE — SIGNIFICANT CHANGE UP
BUN SERPL-MCNC: 10 MG/DL — SIGNIFICANT CHANGE UP (ref 7–23)
CALCIUM SERPL-MCNC: 8.7 MG/DL — SIGNIFICANT CHANGE UP (ref 8.4–10.5)
CHLORIDE SERPL-SCNC: 103 MMOL/L — SIGNIFICANT CHANGE UP (ref 96–108)
CO2 SERPL-SCNC: 21 MMOL/L — LOW (ref 22–31)
COLOR SPEC: SIGNIFICANT CHANGE UP
CREAT SERPL-MCNC: 0.59 MG/DL — SIGNIFICANT CHANGE UP (ref 0.5–1.3)
DIFF PNL FLD: ABNORMAL
EOSINOPHIL # BLD AUTO: 0 K/UL — SIGNIFICANT CHANGE UP (ref 0–0.5)
EOSINOPHIL # BLD AUTO: 0.1 K/UL — SIGNIFICANT CHANGE UP (ref 0–0.5)
EOSINOPHIL # BLD AUTO: 0.1 K/UL — SIGNIFICANT CHANGE UP (ref 0–0.5)
EOSINOPHIL NFR BLD AUTO: 0.3 % — SIGNIFICANT CHANGE UP (ref 0–6)
EOSINOPHIL NFR BLD AUTO: 0.7 % — SIGNIFICANT CHANGE UP (ref 0–6)
EOSINOPHIL NFR BLD AUTO: 0.9 % — SIGNIFICANT CHANGE UP (ref 0–6)
EPI CELLS # UR: ABNORMAL /HPF
FIBRINOGEN PPP-MCNC: 474 MG/DL — SIGNIFICANT CHANGE UP (ref 310–510)
GLUCOSE SERPL-MCNC: 124 MG/DL — HIGH (ref 70–99)
GLUCOSE UR QL: NEGATIVE — SIGNIFICANT CHANGE UP
HCT VFR BLD CALC: 27.7 % — LOW (ref 34.5–45)
HCT VFR BLD CALC: 29.6 % — LOW (ref 34.5–45)
HCT VFR BLD CALC: 31.4 % — LOW (ref 34.5–45)
HGB BLD-MCNC: 10.4 G/DL — LOW (ref 11.5–15.5)
HGB BLD-MCNC: 9 G/DL — LOW (ref 11.5–15.5)
HGB BLD-MCNC: 9.5 G/DL — LOW (ref 11.5–15.5)
INR BLD: 1.17 RATIO — HIGH (ref 0.88–1.16)
INR BLD: 1.34 RATIO — HIGH (ref 0.88–1.16)
KETONES UR-MCNC: NEGATIVE — SIGNIFICANT CHANGE UP
LACTATE BLDV-MCNC: 2.4 MMOL/L — HIGH (ref 0.7–2)
LACTATE SERPL-SCNC: 1 MMOL/L — SIGNIFICANT CHANGE UP (ref 0.7–2)
LEUKOCYTE ESTERASE UR-ACNC: ABNORMAL
LYMPHOCYTES # BLD AUTO: 0.5 K/UL — LOW (ref 1–3.3)
LYMPHOCYTES # BLD AUTO: 0.8 K/UL — LOW (ref 1–3.3)
LYMPHOCYTES # BLD AUTO: 0.9 K/UL — LOW (ref 1–3.3)
LYMPHOCYTES # BLD AUTO: 10 % — LOW (ref 13–44)
LYMPHOCYTES # BLD AUTO: 6.8 % — LOW (ref 13–44)
LYMPHOCYTES # BLD AUTO: 9 % — LOW (ref 13–44)
MCHC RBC-ENTMCNC: 28.1 PG — SIGNIFICANT CHANGE UP (ref 27–34)
MCHC RBC-ENTMCNC: 28.2 PG — SIGNIFICANT CHANGE UP (ref 27–34)
MCHC RBC-ENTMCNC: 28.2 PG — SIGNIFICANT CHANGE UP (ref 27–34)
MCHC RBC-ENTMCNC: 32.1 GM/DL — SIGNIFICANT CHANGE UP (ref 32–36)
MCHC RBC-ENTMCNC: 32.4 GM/DL — SIGNIFICANT CHANGE UP (ref 32–36)
MCHC RBC-ENTMCNC: 33 GM/DL — SIGNIFICANT CHANGE UP (ref 32–36)
MCV RBC AUTO: 85.4 FL — SIGNIFICANT CHANGE UP (ref 80–100)
MCV RBC AUTO: 87.1 FL — SIGNIFICANT CHANGE UP (ref 80–100)
MCV RBC AUTO: 87.4 FL — SIGNIFICANT CHANGE UP (ref 80–100)
MONOCYTES # BLD AUTO: 0.1 K/UL — SIGNIFICANT CHANGE UP (ref 0–0.9)
MONOCYTES # BLD AUTO: 0.4 K/UL — SIGNIFICANT CHANGE UP (ref 0–0.9)
MONOCYTES # BLD AUTO: 0.4 K/UL — SIGNIFICANT CHANGE UP (ref 0–0.9)
MONOCYTES NFR BLD AUTO: 1.4 % — LOW (ref 2–14)
MONOCYTES NFR BLD AUTO: 4.1 % — SIGNIFICANT CHANGE UP (ref 2–14)
MONOCYTES NFR BLD AUTO: 4.4 % — SIGNIFICANT CHANGE UP (ref 2–14)
NEUTROPHILS # BLD AUTO: 7.2 K/UL — SIGNIFICANT CHANGE UP (ref 1.8–7.4)
NEUTROPHILS # BLD AUTO: 7.4 K/UL — SIGNIFICANT CHANGE UP (ref 1.8–7.4)
NEUTROPHILS # BLD AUTO: 7.6 K/UL — HIGH (ref 1.8–7.4)
NEUTROPHILS NFR BLD AUTO: 85 % — HIGH (ref 43–77)
NEUTROPHILS NFR BLD AUTO: 85.5 % — HIGH (ref 43–77)
NEUTROPHILS NFR BLD AUTO: 91.4 % — HIGH (ref 43–77)
NITRITE UR-MCNC: NEGATIVE — SIGNIFICANT CHANGE UP
PH UR: 6.5 — SIGNIFICANT CHANGE UP (ref 5–8)
PLATELET # BLD AUTO: 182 K/UL — SIGNIFICANT CHANGE UP (ref 150–400)
PLATELET # BLD AUTO: 188 K/UL — SIGNIFICANT CHANGE UP (ref 150–400)
PLATELET # BLD AUTO: 220 K/UL — SIGNIFICANT CHANGE UP (ref 150–400)
POTASSIUM SERPL-MCNC: 3.4 MMOL/L — LOW (ref 3.5–5.3)
POTASSIUM SERPL-SCNC: 3.4 MMOL/L — LOW (ref 3.5–5.3)
PROT SERPL-MCNC: 6.9 G/DL — SIGNIFICANT CHANGE UP (ref 6–8.3)
PROT UR-MCNC: SIGNIFICANT CHANGE UP
PROTHROM AB SERPL-ACNC: 12.7 SEC — SIGNIFICANT CHANGE UP (ref 9.8–12.7)
PROTHROM AB SERPL-ACNC: 14.7 SEC — HIGH (ref 9.8–12.7)
RAPID RVP RESULT: SIGNIFICANT CHANGE UP
RBC # BLD: 3.18 M/UL — LOW (ref 3.8–5.2)
RBC # BLD: 3.38 M/UL — LOW (ref 3.8–5.2)
RBC # BLD: 3.68 M/UL — LOW (ref 3.8–5.2)
RBC # FLD: 13.8 % — SIGNIFICANT CHANGE UP (ref 10.3–14.5)
RBC # FLD: 14.1 % — SIGNIFICANT CHANGE UP (ref 10.3–14.5)
RBC # FLD: 14.2 % — SIGNIFICANT CHANGE UP (ref 10.3–14.5)
RBC CASTS # UR COMP ASSIST: ABNORMAL /HPF (ref 0–2)
RH IG SCN BLD-IMP: POSITIVE — SIGNIFICANT CHANGE UP
SODIUM SERPL-SCNC: 140 MMOL/L — SIGNIFICANT CHANGE UP (ref 135–145)
SP GR SPEC: 1.01 — LOW (ref 1.01–1.02)
UROBILINOGEN FLD QL: NEGATIVE — SIGNIFICANT CHANGE UP
WBC # BLD: 8 K/UL — SIGNIFICANT CHANGE UP (ref 3.8–10.5)
WBC # BLD: 8.4 K/UL — SIGNIFICANT CHANGE UP (ref 3.8–10.5)
WBC # BLD: 9 K/UL — SIGNIFICANT CHANGE UP (ref 3.8–10.5)
WBC # FLD AUTO: 8 K/UL — SIGNIFICANT CHANGE UP (ref 3.8–10.5)
WBC # FLD AUTO: 8.4 K/UL — SIGNIFICANT CHANGE UP (ref 3.8–10.5)
WBC # FLD AUTO: 9 K/UL — SIGNIFICANT CHANGE UP (ref 3.8–10.5)
WBC UR QL: ABNORMAL /HPF (ref 0–5)

## 2018-08-10 PROCEDURE — 99285 EMERGENCY DEPT VISIT HI MDM: CPT | Mod: 25

## 2018-08-10 PROCEDURE — 71045 X-RAY EXAM CHEST 1 VIEW: CPT | Mod: 26

## 2018-08-10 PROCEDURE — 93010 ELECTROCARDIOGRAM REPORT: CPT

## 2018-08-10 PROCEDURE — 76830 TRANSVAGINAL US NON-OB: CPT | Mod: 26

## 2018-08-10 PROCEDURE — 93975 VASCULAR STUDY: CPT | Mod: 26

## 2018-08-10 RX ORDER — ONDANSETRON 8 MG/1
4 TABLET, FILM COATED ORAL ONCE
Qty: 0 | Refills: 0 | Status: COMPLETED | OUTPATIENT
Start: 2018-08-10 | End: 2018-08-10

## 2018-08-10 RX ORDER — IPRATROPIUM BROMIDE 0.2 MG/ML
500 SOLUTION, NON-ORAL INHALATION ONCE
Qty: 0 | Refills: 0 | Status: COMPLETED | OUTPATIENT
Start: 2018-08-10 | End: 2018-08-10

## 2018-08-10 RX ORDER — CEFTRIAXONE 500 MG/1
1 INJECTION, POWDER, FOR SOLUTION INTRAMUSCULAR; INTRAVENOUS ONCE
Qty: 0 | Refills: 0 | Status: DISCONTINUED | OUTPATIENT
Start: 2018-08-10 | End: 2018-08-10

## 2018-08-10 RX ORDER — ACETAMINOPHEN 500 MG
650 TABLET ORAL ONCE
Qty: 0 | Refills: 0 | Status: COMPLETED | OUTPATIENT
Start: 2018-08-10 | End: 2018-08-10

## 2018-08-10 RX ORDER — POTASSIUM CHLORIDE 20 MEQ
40 PACKET (EA) ORAL ONCE
Qty: 0 | Refills: 0 | Status: COMPLETED | OUTPATIENT
Start: 2018-08-10 | End: 2018-08-10

## 2018-08-10 RX ORDER — PIPERACILLIN AND TAZOBACTAM 4; .5 G/20ML; G/20ML
3.38 INJECTION, POWDER, LYOPHILIZED, FOR SOLUTION INTRAVENOUS EVERY 8 HOURS
Qty: 0 | Refills: 0 | Status: DISCONTINUED | OUTPATIENT
Start: 2018-08-10 | End: 2018-08-13

## 2018-08-10 RX ORDER — SODIUM CHLORIDE 9 MG/ML
1000 INJECTION, SOLUTION INTRAVENOUS ONCE
Qty: 0 | Refills: 0 | Status: COMPLETED | OUTPATIENT
Start: 2018-08-10 | End: 2018-08-10

## 2018-08-10 RX ORDER — GENTAMICIN SULFATE 40 MG/ML
320 VIAL (ML) INJECTION ONCE
Qty: 0 | Refills: 0 | Status: COMPLETED | OUTPATIENT
Start: 2018-08-10 | End: 2018-08-10

## 2018-08-10 RX ORDER — SODIUM CHLORIDE 9 MG/ML
1000 INJECTION, SOLUTION INTRAVENOUS
Qty: 0 | Refills: 0 | Status: DISCONTINUED | OUTPATIENT
Start: 2018-08-10 | End: 2018-08-10

## 2018-08-10 RX ORDER — DIPHENHYDRAMINE HCL 50 MG
25 CAPSULE ORAL ONCE
Qty: 0 | Refills: 0 | Status: COMPLETED | OUTPATIENT
Start: 2018-08-10 | End: 2018-08-10

## 2018-08-10 RX ORDER — HYDROMORPHONE HYDROCHLORIDE 2 MG/ML
0.5 INJECTION INTRAMUSCULAR; INTRAVENOUS; SUBCUTANEOUS
Qty: 0 | Refills: 0 | Status: DISCONTINUED | OUTPATIENT
Start: 2018-08-10 | End: 2018-08-11

## 2018-08-10 RX ORDER — CARBOPROST TROMETHAMINE 250 UG/ML
250 INJECTION, SOLUTION INTRAMUSCULAR ONCE
Qty: 0 | Refills: 0 | Status: COMPLETED | OUTPATIENT
Start: 2018-08-10 | End: 2018-08-10

## 2018-08-10 RX ORDER — ACETAMINOPHEN 500 MG
1000 TABLET ORAL ONCE
Qty: 0 | Refills: 0 | Status: DISCONTINUED | OUTPATIENT
Start: 2018-08-10 | End: 2018-08-14

## 2018-08-10 RX ORDER — DOCUSATE SODIUM 100 MG
100 CAPSULE ORAL THREE TIMES A DAY
Qty: 0 | Refills: 0 | Status: DISCONTINUED | OUTPATIENT
Start: 2018-08-10 | End: 2018-08-10

## 2018-08-10 RX ORDER — KETOROLAC TROMETHAMINE 30 MG/ML
30 SYRINGE (ML) INJECTION EVERY 6 HOURS
Qty: 0 | Refills: 0 | Status: DISCONTINUED | OUTPATIENT
Start: 2018-08-10 | End: 2018-08-10

## 2018-08-10 RX ORDER — ACETAMINOPHEN 500 MG
1000 TABLET ORAL ONCE
Qty: 0 | Refills: 0 | Status: COMPLETED | OUTPATIENT
Start: 2018-08-10 | End: 2018-08-10

## 2018-08-10 RX ORDER — SODIUM CHLORIDE 9 MG/ML
1000 INJECTION, SOLUTION INTRAVENOUS
Qty: 0 | Refills: 0 | Status: DISCONTINUED | OUTPATIENT
Start: 2018-08-10 | End: 2018-08-12

## 2018-08-10 RX ORDER — KETOROLAC TROMETHAMINE 30 MG/ML
15 SYRINGE (ML) INJECTION ONCE
Qty: 0 | Refills: 0 | Status: DISCONTINUED | OUTPATIENT
Start: 2018-08-10 | End: 2018-08-10

## 2018-08-10 RX ORDER — DIPHENOXYLATE HCL/ATROPINE 2.5-.025MG
2 TABLET ORAL ONCE
Qty: 0 | Refills: 0 | Status: DISCONTINUED | OUTPATIENT
Start: 2018-08-10 | End: 2018-08-11

## 2018-08-10 RX ORDER — HEPARIN SODIUM 5000 [USP'U]/ML
5000 INJECTION INTRAVENOUS; SUBCUTANEOUS EVERY 12 HOURS
Qty: 0 | Refills: 0 | Status: DISCONTINUED | OUTPATIENT
Start: 2018-08-11 | End: 2018-08-14

## 2018-08-10 RX ORDER — OXYCODONE HYDROCHLORIDE 5 MG/1
5 TABLET ORAL
Qty: 0 | Refills: 0 | Status: DISCONTINUED | OUTPATIENT
Start: 2018-08-10 | End: 2018-08-14

## 2018-08-10 RX ORDER — BENZOCAINE AND MENTHOL 5; 1 G/100ML; G/100ML
1 LIQUID ORAL
Qty: 0 | Refills: 0 | Status: DISCONTINUED | OUTPATIENT
Start: 2018-08-10 | End: 2018-08-14

## 2018-08-10 RX ORDER — GENTAMICIN SULFATE 40 MG/ML
320 VIAL (ML) INJECTION EVERY 24 HOURS
Qty: 0 | Refills: 0 | Status: DISCONTINUED | OUTPATIENT
Start: 2018-08-10 | End: 2018-08-13

## 2018-08-10 RX ORDER — SODIUM CHLORIDE 9 MG/ML
2000 INJECTION, SOLUTION INTRAVENOUS ONCE
Qty: 0 | Refills: 0 | Status: COMPLETED | OUTPATIENT
Start: 2018-08-10 | End: 2018-08-10

## 2018-08-10 RX ORDER — PIPERACILLIN AND TAZOBACTAM 4; .5 G/20ML; G/20ML
3.38 INJECTION, POWDER, LYOPHILIZED, FOR SOLUTION INTRAVENOUS ONCE
Qty: 0 | Refills: 0 | Status: COMPLETED | OUTPATIENT
Start: 2018-08-10 | End: 2018-08-10

## 2018-08-10 RX ADMIN — Medication 500 MICROGRAM(S): at 22:23

## 2018-08-10 RX ADMIN — SODIUM CHLORIDE 2000 MILLILITER(S): 9 INJECTION, SOLUTION INTRAVENOUS at 04:36

## 2018-08-10 RX ADMIN — Medication 1000 MILLIGRAM(S): at 22:20

## 2018-08-10 RX ADMIN — SODIUM CHLORIDE 1000 MILLILITER(S): 9 INJECTION, SOLUTION INTRAVENOUS at 05:48

## 2018-08-10 RX ADMIN — CARBOPROST TROMETHAMINE 250 MICROGRAM(S): 250 INJECTION, SOLUTION INTRAMUSCULAR at 20:07

## 2018-08-10 RX ADMIN — PIPERACILLIN AND TAZOBACTAM 200 GRAM(S): 4; .5 INJECTION, POWDER, LYOPHILIZED, FOR SOLUTION INTRAVENOUS at 14:16

## 2018-08-10 RX ADMIN — ONDANSETRON 4 MILLIGRAM(S): 8 TABLET, FILM COATED ORAL at 19:15

## 2018-08-10 RX ADMIN — SODIUM CHLORIDE 150 MILLILITER(S): 9 INJECTION, SOLUTION INTRAVENOUS at 19:35

## 2018-08-10 RX ADMIN — BENZOCAINE AND MENTHOL 1 LOZENGE: 5; 1 LIQUID ORAL at 22:27

## 2018-08-10 RX ADMIN — Medication 500 MILLIGRAM(S): at 11:24

## 2018-08-10 RX ADMIN — Medication 25 MILLIGRAM(S): at 22:22

## 2018-08-10 RX ADMIN — Medication 400 MILLIGRAM(S): at 15:18

## 2018-08-10 RX ADMIN — Medication 650 MILLIGRAM(S): at 04:36

## 2018-08-10 RX ADMIN — SODIUM CHLORIDE 2000 MILLILITER(S): 9 INJECTION, SOLUTION INTRAVENOUS at 05:48

## 2018-08-10 RX ADMIN — ONDANSETRON 4 MILLIGRAM(S): 8 TABLET, FILM COATED ORAL at 04:36

## 2018-08-10 RX ADMIN — PIPERACILLIN AND TAZOBACTAM 25 GRAM(S): 4; .5 INJECTION, POWDER, LYOPHILIZED, FOR SOLUTION INTRAVENOUS at 21:44

## 2018-08-10 RX ADMIN — Medication 40 MILLIEQUIVALENT(S): at 06:38

## 2018-08-10 RX ADMIN — Medication 15 MILLIGRAM(S): at 07:41

## 2018-08-10 RX ADMIN — SODIUM CHLORIDE 1000 MILLILITER(S): 9 INJECTION, SOLUTION INTRAVENOUS at 07:30

## 2018-08-10 RX ADMIN — Medication 0.2 MILLIGRAM(S): at 22:31

## 2018-08-10 RX ADMIN — Medication 15 MILLIGRAM(S): at 04:36

## 2018-08-10 RX ADMIN — Medication 400 MILLIGRAM(S): at 21:43

## 2018-08-10 NOTE — BRIEF OPERATIVE NOTE - PROCEDURE
<<-----Click on this checkbox to enter Procedure Dilation and curettage, uterus, for retained products of conception  08/10/2018    Active  [facility]

## 2018-08-10 NOTE — H&P ADULT - NSHPREVIEWOFSYSTEMS_GEN_ALL_CORE
CONSTITUTIONAL: +weakness/fevesr/chills  EYES/ENT: No visual changes;  No vertigo or throat pain   NECK: No pain or stiffness  RESPIRATORY: No cough, wheezing, hemoptysis; No shortness of breath  CARDIOVASCULAR: No chest pain or palpitations  GASTROINTESTINAL: No abdominal or epigastric pain. No nausea, vomiting, or hematemesis; No diarrhea or constipation. No melena or hematochezia.  GENITOURINARY: No dysuria, frequency or hematuria  NEUROLOGICAL: +dizziness  SKIN: No itching, burning, rashes, or lesions   All other review of systems is negative unless indicated above.

## 2018-08-10 NOTE — BRIEF OPERATIVE NOTE - OPERATION/FINDINGS
Anteverted uterus with thickened endometrial stripe. After removal of all products, ultrasound revealed rapid filling of endometrium with blood. Rapid hemorrhage of approximately 1 L during procedure s/p Hemabate x 2, Methergine IM x 1, TXA, Cytotec PA. Bakri balloon and vaginal packing x1 placed under sono guidance.

## 2018-08-10 NOTE — ED ADULT TRIAGE NOTE - CHIEF COMPLAINT QUOTE
brought in by ems for generalized weakness, complaining of subjective fever, headache, nausea and vomiting x3 days  recent history of vaginal delivery, full term x2 weeks ago

## 2018-08-10 NOTE — ED ADULT NURSE REASSESSMENT NOTE - NS ED NURSE REASSESS COMMENT FT1
Pt resting, sleeping. When awoken reports no change in pain following ketorolac. Temp improved to 99 oral following tylenol.

## 2018-08-10 NOTE — H&P ADULT - NSHPLABSRESULTS_GEN_ALL_CORE
LABS:                        10.4   8.0   )-----------( 220      ( 10 Aug 2018 04:38 )             31.4     08-10    140  |  103  |  10  ----------------------------<  124<H>  3.4<L>   |  21<L>  |  0.59    Ca    8.7      10 Aug 2018 04:38    TPro  6.9  /  Alb  3.7  /  TBili  0.9  /  DBili  x   /  AST  14  /  ALT  20  /  AlkPhos  97  08-10    PT/INR - ( 10 Aug 2018 04:38 )   PT: 12.7 sec;   INR: 1.17 ratio         PTT - ( 10 Aug 2018 04:38 )  PTT:26.7 sec  Urinalysis Basic - ( 10 Aug 2018 08:52 )    Color: PL Yellow / Appearance: SL Turbid / S.007 / pH: x  Gluc: x / Ketone: Negative  / Bili: Negative / Urobili: Negative   Blood: x / Protein: Trace / Nitrite: Negative   Leuk Esterase: Large / RBC: 5-10 /HPF / WBC 25-50 /HPF   Sq Epi: x / Non Sq Epi: Moderate /HPF / Bacteria: x    Blood Type: A Positive    Urine Culture  Proteus mirabilis sensitive to Zosyn

## 2018-08-10 NOTE — PROVIDER CONTACT NOTE (CHANGE IN STATUS NOTIFICATION) - SITUATION
Pt has elevated temperature of 38.3, complains of dizziness and lightness as well as a rapid heart rate and coughing

## 2018-08-10 NOTE — ED PROVIDER NOTE - MEDICAL DECISION MAKING DETAILS
38 yo female presenting with fevers aches uri symptoms; likely uri vs viral illness; will obtain labs cultures fluids urine cxr ---> re juanal

## 2018-08-10 NOTE — ED ADULT NURSE NOTE - NSIMPLEMENTINTERV_GEN_ALL_ED
Implemented All Universal Safety Interventions:  Page to call system. Call bell, personal items and telephone within reach. Instruct patient to call for assistance. Room bathroom lighting operational. Non-slip footwear when patient is off stretcher. Physically safe environment: no spills, clutter or unnecessary equipment. Stretcher in lowest position, wheels locked, appropriate side rails in place.

## 2018-08-10 NOTE — PROVIDER CONTACT NOTE (CHANGE IN STATUS NOTIFICATION) - RECOMMENDATIONS
instructed for pt to call for help if any symptoms worsen. Instructed resident that pt needs to be immediately assessed by resident.

## 2018-08-10 NOTE — ED PROVIDER NOTE - ATTENDING CONTRIBUTION TO CARE
ATTENDING MD:  Dionisio PRICE, personally have seen and examined this patient.  I have discussed all aspects of care with the resident physician. Resident note reviewed and agree on plan of care and except where noted.  See HPI, PE, and MDM for details.    GEN: mildly ill appearing, notnoxic  HEENT: NCAT, eyes clear, mucus membranes are moist, oropharynx is erythemetous, +cobbelstoning,   CV: normal rate, regular rhythm  BREAST: nursing as chaperone no erythema or induration, no palpation performed  RESP: lungs clear to auscultation bilaterally, equal breath sounds bilaterally   GI: soft, nondistended, minimal suprapubic tenderness on initial exam not repeated on successive exams  : no CVAT, pelvic exam deferred to OB  MSK: no extremity swelling or tenderness  NEURO: awake alert nonfocal, no meningismus  SKIN: warm, dry, no rash    MDM: Pt with fevers, body aches, runny nose, cough, sore thraot, no abdominal pain, no foul-smelling discharge. code sepsis called, likely viral syndrome r/o pneumonia, r/o UTI, low suspicion of endometritis but will call OB for eval.

## 2018-08-10 NOTE — H&P ADULT - PROBLEM SELECTOR PLAN 1
-Start Zosyn and Gentamycin for UTI and presumed endometritis. Proteus mirabilis sensitive to zosyn.   - f/u Bcx and UCx from today  - repeat Lactate  - IV hydration  - Reg diet  - PO pain meds PRN  - Daily CBC with diff  - pending TVUS to r/o retained POC -Start Zosyn and Gentamycin for UTI and presumed endometritis. Proteus mirabilis sensitive to zosyn.   - f/u Bcx and UCx from today  - repeat Lactate  - IV hydration  - NPO  - PO pain meds PRN  - Daily CBC with diff  - pending TVUS to r/o retained POC

## 2018-08-10 NOTE — ED ADULT NURSE NOTE - OBJECTIVE STATEMENT
38 yo F presents c/o x1 week fever, chills, body aches, nausea, & increasing L breast pain. Pt states she is 2 weeks postpartum, pregnancy had no complications, no issues since delivery. Reports taking ibuprofen at home with minimal intermittent relief in 38 yo F presents c/o x1 week fever, chills, body aches, nausea, & increasing L breast pain. Pt states she is 2 weeks postpartum, pregnancy had no complications, no issues since delivery. Reports taking ibuprofen at home with minimal intermittent relief in symptoms. Denies recent travel or sick contacts. Denies CP, SOB, HA, change in vision, abdominal pain, constipation, diarrhea, urinary complaints. 36 yo F presents c/o x1 week fever, chills, body aches, nausea, & increasing L breast pain. Pt states she is 2 weeks postpartum, pregnancy had no complications, no issues since delivery. Reports taking ibuprofen at home with minimal intermittent relief in symptoms. Denies recent travel or sick contacts. Denies CP, SOB, HA, change in vision, abdominal pain, constipation, diarrhea, urinary complaints. Reports vomiting at home today. IV access and labs obtained. No wounds, redness, or swelling noted.

## 2018-08-10 NOTE — ED PROVIDER NOTE - NS ED ROS FT
Constitutional: + fever  Eyes: no conjunctivitis  Ears: no ear pain   Nose: + nasal congestion, Mouth/Throat: + throat pain, Neck: no stiffness  Cardiovascular: no chest pain  Chest: + cough  Gastrointestinal: no abdominal pain, + vomiting and no diarrhea  MSK: no joint pain  : no dysuria  Skin: no rash  Neuro: no LOC Constitutional: + fever  Eyes: no conjunctivitis  Ears: no ear pain   Nose: + nasal congestion, Mouth/Throat: + throat pain, Neck: no stiffness  Cardiovascular: no chest pain  Chest: + cough  Gastrointestinal: no abdominal pain, + vomiting and no diarrhea  MSK: diffuse myalgaias, no arthralgias, no neck pain or stiffness  : no dysuria, no abnormal discharge, no foul-smelling discharge  Skin: no rash, no breast pain or tenderness  Neuro: no LOC

## 2018-08-10 NOTE — H&P ADULT - ASSESSMENT
38yo  s/p  on  now admitted PP day 15 with postpartum infection, UTI (+proteus mirabilis) and likely endometritis

## 2018-08-10 NOTE — H&P ADULT - NSHPPHYSICALEXAM_GEN_ALL_CORE
Vital Signs Last 24 Hrs  T(C): 37.2 (10 Aug 2018 07:05), Max: 39.4 (10 Aug 2018 04:30)  T(F): 99 (10 Aug 2018 07:05), Max: 103 (10 Aug 2018 04:30)  HR: 88 (10 Aug 2018 07:05) (88 - 135)  BP: 103/56 (10 Aug 2018 07:05) (91/54 - 113/72)  BP(mean): --  RR: 16 (10 Aug 2018 07:05) (14 - 24)  SpO2: 99% (10 Aug 2018 07:05) (97% - 99%)    PHYSICAL EXAM:  Constitutional: appears unwell   Breasts: no tenderness, no nodules, no erythema  Respiratory: clear  Cardiovascular: regular rate and rhythm  Gastrointestinal: soft, mildly tender in lower abdomen,  No hepatosplenomegaly  Genitourinary: normal external genitalia  Speculum: foul smelling discharge but no pus  Cervix: no CMT  Uterus: mildly tender by fundus  Adnexa: non tender, no palpable masses  Extremities: NTBL

## 2018-08-10 NOTE — ED PROVIDER NOTE - PROGRESS NOTE DETAILS
spoke to lang from ob who is aware of patient, will pass message to team -keith spoke to lang from ob who is aware of patient, requesting we defer pelvic exam to them, passed on to morning team -keith +UA now showing possible bacterial source, donell lgive ceftriaxone. this should be considered time of recognition of bacterial sepsis

## 2018-08-10 NOTE — CHART NOTE - NSCHARTNOTEFT_GEN_A_CORE
Notified by RN of fever  Pt seen at bedside.    Vital Signs Last 24 Hrs  T(C): 38.3 (10 Aug 2018 14:27), Max: 39.4 (10 Aug 2018 04:30)  T(F): 101 (10 Aug 2018 14:27), Max: 103 (10 Aug 2018 04:30)  HR: 113 (10 Aug 2018 14:27) (85 - 135)  BP: 98/63 (10 Aug 2018 14:27) (90/58 - 113/72)  RR: 16 (10 Aug 2018 14:27) (14 - 24)  SpO2: 98% (10 Aug 2018 14:27) (97% - 100%)    Abd: +fundal tenderness    - will consent pt for urgent DVC Notified by RN of fever  Pt seen at bedside. She feels tired, and is dizzy when she gets out of bed. She denies chest pain, palpitations.  Reports persistent abdominal pain, no bleeding.     Vital Signs Last 24 Hrs  T(C): 38.3 (10 Aug 2018 14:27), Max: 39.4 (10 Aug 2018 04:30)  T(F): 101 (10 Aug 2018 14:27), Max: 103 (10 Aug 2018 04:30)  HR: 113 (10 Aug 2018 14:27) (85 - 135)  BP: 98/63 (10 Aug 2018 14:27) (90/58 - 113/72)  RR: 16 (10 Aug 2018 14:27) (14 - 24)  SpO2: 98% (10 Aug 2018 14:27) (97% - 100%)  Gen: ill appearing  Heart: tachycardic, regular rhythm  Lungs: CTAB  Abd: +fundal tenderness  Ext: no calf tenderness bilaterally      Pt admitted with fevers and tachycardia c/w sepsis. TVUS confirms retained products  - OR called for emergent ultrasound-guided DVC  - will consent pt for urgent DVC  - continue continuous VS monitoring  - continue LR@150cc/hr  - Tylenol now for fever    D/w Dr. Dionisio Mascorro, R3

## 2018-08-10 NOTE — ED PROVIDER NOTE - PHYSICAL EXAMINATION
gen: well appearing  Mentation: AAO x 3  psych: mood appropriate  ENT: airway patent, no exudates no cervical lymphadenopathy   Eyes: conjunctivae clear bilaterally  Cardio: tachycardic, no m/r/g  Resp: normal BS b/l  GI: s/nt/nd   Neuro: AAO x 3, sensation and motor function intact, CN 2-12 intact  Skin: No evidence of rash  MSK: normal movement of all extremities

## 2018-08-10 NOTE — PROVIDER CONTACT NOTE (CHANGE IN STATUS NOTIFICATION) - ASSESSMENT
pt complains of chills, pain in abdomen, light headiness when both lying and sitting. Visual heart palpitations seen in chest and neck.  BP 98/63  RR 16 o2 98%

## 2018-08-10 NOTE — H&P ADULT - HISTORY OF PRESENT ILLNESS
HPI    37y  s/p  on  who now presents with fevers/chills and generalized fatigue. Patient was admitted to [facility] on -8/3 to Medicine service for chest pain/shortness of breath, back pain and headache. She received a CT Angio, MRI head/neck, Echo which was all negative for any pathology. Urine culture was sent on  which is + for proteus mirabilis but per patient, no antibiotics were ever given to her at [place].    Today, she is still complaining of weakness, fevers/chills, dizziness. She notes that her lokia is foul smelling. She denies any dysuria, chest pain or shortness of breath.     OB/GYN HISTORY:  x 3  PAST MEDICAL & SURGICAL HISTORY: tympanostomy    Allergies  eggs (Swelling)  No Known Drug Allergies

## 2018-08-10 NOTE — ED PROVIDER NOTE - OBJECTIVE STATEMENT
38 yo female presenting with 2 week hx of fevers chills nausea vomiting body aches sore throat and nasal congestion.  was seen at hospital on friday and was discharged.  has been taking ibuprofen with moderate temporary relief of symptoms.  describes pains as diffuse achiness.  no recent travel no sick contacts.  denies foul smelling discharge.  denies redness or tenderness with the breasts.  post partum 2 weeks.

## 2018-08-10 NOTE — H&P ADULT - ATTENDING COMMENTS
pt seen and examined. h and p and results reviewed, Pt with retained POC and febrile  will bring back to OR for D and C, sono guidance

## 2018-08-11 ENCOUNTER — RESULT REVIEW (OUTPATIENT)
Age: 38
End: 2018-08-11

## 2018-08-11 LAB
ANION GAP SERPL CALC-SCNC: 10 MMOL/L — SIGNIFICANT CHANGE UP (ref 5–17)
APTT BLD: 25 SEC — LOW (ref 27.5–37.4)
BASOPHILS # BLD AUTO: 0 K/UL — SIGNIFICANT CHANGE UP (ref 0–0.2)
BASOPHILS NFR BLD AUTO: 0.1 % — SIGNIFICANT CHANGE UP (ref 0–2)
BUN SERPL-MCNC: 8 MG/DL — SIGNIFICANT CHANGE UP (ref 7–23)
CALCIUM SERPL-MCNC: 8.2 MG/DL — LOW (ref 8.4–10.5)
CHLORIDE SERPL-SCNC: 110 MMOL/L — HIGH (ref 96–108)
CO2 SERPL-SCNC: 22 MMOL/L — SIGNIFICANT CHANGE UP (ref 22–31)
CREAT SERPL-MCNC: 0.65 MG/DL — SIGNIFICANT CHANGE UP (ref 0.5–1.3)
EOSINOPHIL # BLD AUTO: 0.1 K/UL — SIGNIFICANT CHANGE UP (ref 0–0.5)
EOSINOPHIL NFR BLD AUTO: 0.9 % — SIGNIFICANT CHANGE UP (ref 0–6)
FIBRINOGEN PPP-MCNC: 513 MG/DL — HIGH (ref 310–510)
GLUCOSE SERPL-MCNC: 129 MG/DL — HIGH (ref 70–99)
HCT VFR BLD CALC: 29.1 % — LOW (ref 34.5–45)
HGB BLD-MCNC: 8.6 G/DL — LOW (ref 11.5–15.5)
INR BLD: 1.29 RATIO — HIGH (ref 0.88–1.16)
LYMPHOCYTES # BLD AUTO: 1.3 K/UL — SIGNIFICANT CHANGE UP (ref 1–3.3)
LYMPHOCYTES # BLD AUTO: 21 % — SIGNIFICANT CHANGE UP (ref 13–44)
MCHC RBC-ENTMCNC: 26.1 PG — LOW (ref 27–34)
MCHC RBC-ENTMCNC: 29.5 GM/DL — LOW (ref 32–36)
MCV RBC AUTO: 88.5 FL — SIGNIFICANT CHANGE UP (ref 80–100)
MONOCYTES # BLD AUTO: 0.4 K/UL — SIGNIFICANT CHANGE UP (ref 0–0.9)
MONOCYTES NFR BLD AUTO: 6.1 % — SIGNIFICANT CHANGE UP (ref 2–14)
NEUTROPHILS # BLD AUTO: 4.5 K/UL — SIGNIFICANT CHANGE UP (ref 1.8–7.4)
NEUTROPHILS NFR BLD AUTO: 71.8 % — SIGNIFICANT CHANGE UP (ref 43–77)
PLATELET # BLD AUTO: 183 K/UL — SIGNIFICANT CHANGE UP (ref 150–400)
POTASSIUM SERPL-MCNC: 4.3 MMOL/L — SIGNIFICANT CHANGE UP (ref 3.5–5.3)
POTASSIUM SERPL-SCNC: 4.3 MMOL/L — SIGNIFICANT CHANGE UP (ref 3.5–5.3)
PROTHROM AB SERPL-ACNC: 14.1 SEC — HIGH (ref 9.8–12.7)
RBC # BLD: 3.29 M/UL — LOW (ref 3.8–5.2)
RBC # FLD: 14.3 % — SIGNIFICANT CHANGE UP (ref 10.3–14.5)
SODIUM SERPL-SCNC: 142 MMOL/L — SIGNIFICANT CHANGE UP (ref 135–145)
WBC # BLD: 6.2 K/UL — SIGNIFICANT CHANGE UP (ref 3.8–10.5)
WBC # FLD AUTO: 6.2 K/UL — SIGNIFICANT CHANGE UP (ref 3.8–10.5)

## 2018-08-11 RX ORDER — LANOLIN
1 OINTMENT (GRAM) TOPICAL THREE TIMES A DAY
Qty: 0 | Refills: 0 | Status: DISCONTINUED | OUTPATIENT
Start: 2018-08-11 | End: 2018-08-14

## 2018-08-11 RX ORDER — ALBUTEROL 90 UG/1
2 AEROSOL, METERED ORAL EVERY 6 HOURS
Qty: 0 | Refills: 0 | Status: DISCONTINUED | OUTPATIENT
Start: 2018-08-11 | End: 2018-08-14

## 2018-08-11 RX ADMIN — Medication 200 MILLIGRAM(S): at 05:04

## 2018-08-11 RX ADMIN — HEPARIN SODIUM 5000 UNIT(S): 5000 INJECTION INTRAVENOUS; SUBCUTANEOUS at 12:59

## 2018-08-11 RX ADMIN — Medication 2 TABLET(S): at 01:13

## 2018-08-11 RX ADMIN — ALBUTEROL 2 PUFF(S): 90 AEROSOL, METERED ORAL at 06:37

## 2018-08-11 RX ADMIN — Medication 0.2 MILLIGRAM(S): at 01:14

## 2018-08-11 RX ADMIN — PIPERACILLIN AND TAZOBACTAM 25 GRAM(S): 4; .5 INJECTION, POWDER, LYOPHILIZED, FOR SOLUTION INTRAVENOUS at 22:00

## 2018-08-11 RX ADMIN — HEPARIN SODIUM 5000 UNIT(S): 5000 INJECTION INTRAVENOUS; SUBCUTANEOUS at 01:20

## 2018-08-11 RX ADMIN — PIPERACILLIN AND TAZOBACTAM 25 GRAM(S): 4; .5 INJECTION, POWDER, LYOPHILIZED, FOR SOLUTION INTRAVENOUS at 06:29

## 2018-08-11 RX ADMIN — Medication 0.2 MILLIGRAM(S): at 05:05

## 2018-08-11 RX ADMIN — PIPERACILLIN AND TAZOBACTAM 25 GRAM(S): 4; .5 INJECTION, POWDER, LYOPHILIZED, FOR SOLUTION INTRAVENOUS at 12:58

## 2018-08-11 RX ADMIN — Medication 0.2 MILLIGRAM(S): at 10:11

## 2018-08-11 RX ADMIN — Medication 1 APPLICATION(S): at 13:48

## 2018-08-11 RX ADMIN — BENZOCAINE AND MENTHOL 1 LOZENGE: 5; 1 LIQUID ORAL at 12:58

## 2018-08-11 RX ADMIN — Medication 1 APPLICATION(S): at 22:01

## 2018-08-11 RX ADMIN — Medication 0.2 MILLIGRAM(S): at 12:58

## 2018-08-11 NOTE — PROGRESS NOTE ADULT - PROBLEM SELECTOR PLAN 1
Neuro: IV tylenol, Transition to oral pain meds.  CV: Hemodynamically stable.  Pulm: Saturating well on room air. ventolin PRN wheezing/SOB  GI: Advance to regular diet if AM cbc stable  : UOP adequate, d/c cid when Bakri and vaginal packing removed  Heme: Continue HSQ and SCDs for DVT ppx  ID: Day 2 of zosyn, gentamicin for endometritis, retained POC. f/u am CBC.   Dispo: continue routine post op care    Lory Wheeler, PGY-3

## 2018-08-11 NOTE — CHART NOTE - NSCHARTNOTEFT_GEN_A_CORE
Bakri VPx1 and cid removed at bedside  Minimal Bakri output    Uncomplicated removal, no bleeding     Pt will be DTV by 7p    Lscanlon R3

## 2018-08-11 NOTE — PROGRESS NOTE ADULT - SUBJECTIVE AND OBJECTIVE BOX
POD# 1  HD#2    Patient seen and examined at bedside. Patient had SOB after receiving hemabate in PACU, initially denied hx of asthma but later endorsed hx of using ventolin in the past for SOB. respiratory status improved after ventolin in PACU. She continues to reports some SOB, requested inhaler. Reports pelvic discomfort. Patient is bedrest, NPO. Has not yet passed flatus. [place], bakri, and  is still in place. Denies CP, N/V, fevers, and chills.    Vital Signs Last 24 Hours  T(C): 37.4 (08-11-18 @ 05:37), Max: 38.3 (08-10-18 @ 14:27)  HR: 66 (08-11-18 @ 05:37) (61 - 113)  BP: 95/58 (08-11-18 @ 05:37) (89/54 - 127/61)  RR: 16 (08-11-18 @ 05:37) (14 - 18)  SpO2: 97% (08-11-18 @ 05:37) (97% - 100%)    I&O's Summary    10 Aug 2018 07:01  -  11 Aug 2018 07:00  --------------------------------------------------------  IN: 2210 mL / OUT: 2400 mL / NET: -190 mL        Physical Exam:  General: NAD  CV: RRR  Lungs: CTAB  Abdomen: Soft, mild tenderness, non-distended, +BS  Perineum:  with no blood or drainage. Bakri with scant output overnight  Ext: No pain or swelling    Labs:                        9.5    9.0   )-----------( 188      ( 10 Aug 2018 18:28 )             29.6   baso 0.1    eos 0.7    imm gran x      lymph 10.0   mono 4.1    poly 85.0                         9.0    8.4   )-----------( 182      ( 10 Aug 2018 14:34 )             27.7   baso 0.2    eos 0.9    imm gran x      lymph 9.0    mono 4.4    poly 85.5                         10.4   8.0   )-----------( 220      ( 10 Aug 2018 04:38 )             31.4   baso 0.1    eos 0.3    imm gran x      lymph 6.8    mono 1.4    poly 91.4       MEDICATIONS  (STANDING):  acetaminophen  IVPB. 1000 milliGRAM(s) IV Intermittent once  gentamicin   IVPB 320 milliGRAM(s) IV Intermittent every 24 hours  heparin  Injectable 5000 Unit(s) SubCutaneous every 12 hours  lactated ringers. 1000 milliLiter(s) (150 mL/Hr) IV Continuous <Continuous>  methylergonovine 0.2 milliGRAM(s) Oral every 4 hours  piperacillin/tazobactam IVPB. 3.375 Gram(s) IV Intermittent every 8 hours    MEDICATIONS  (PRN):  ALBUTerol    90 MICROgram(s) HFA Inhaler 2 Puff(s) Inhalation every 6 hours PRN Shortness of Breath and/or Wheezing  benzocaine 15 mG/menthol 3.6 mG Lozenge 1 Lozenge Oral every 2 hours PRN Sore Throat  oxyCODONE    IR 5 milliGRAM(s) Oral every 3 hours PRN Severe Pain (7 - 10)

## 2018-08-11 NOTE — PROGRESS NOTE ADULT - SUBJECTIVE AND OBJECTIVE BOX
attending note    pt passed a 4 cm clot earlier right after bakri balloon removed - scant bleeding since  afebrile  abd --very tender over uterus  ext-neg    a/p s/p d/c for retained productswith ebl 1000cc- bakri balloon dcd -clinically with endometritis  continue zosyn /gent  pt elevated - repeat in am  hct stable

## 2018-08-11 NOTE — PROGRESS NOTE ADULT - ASSESSMENT
38yo F POD x1 d/p DVC and placement of Bakri for retained POC and intra-op hemorrhage with sepsis secondary to endometritis/ retained POD. Now hemodynamically stable with minimal bleeding

## 2018-08-12 LAB
-  AMIKACIN: SIGNIFICANT CHANGE UP
-  AMOXICILLIN/CLAVULANIC ACID: SIGNIFICANT CHANGE UP
-  AMPICILLIN/SULBACTAM: SIGNIFICANT CHANGE UP
-  AMPICILLIN: SIGNIFICANT CHANGE UP
-  AZTREONAM: SIGNIFICANT CHANGE UP
-  CEFAZOLIN: SIGNIFICANT CHANGE UP
-  CEFEPIME: SIGNIFICANT CHANGE UP
-  CEFOXITIN: SIGNIFICANT CHANGE UP
-  CEFTRIAXONE: SIGNIFICANT CHANGE UP
-  CIPROFLOXACIN: SIGNIFICANT CHANGE UP
-  ERTAPENEM: SIGNIFICANT CHANGE UP
-  GENTAMICIN: SIGNIFICANT CHANGE UP
-  LEVOFLOXACIN: SIGNIFICANT CHANGE UP
-  MEROPENEM: SIGNIFICANT CHANGE UP
-  NITROFURANTOIN: SIGNIFICANT CHANGE UP
-  PIPERACILLIN/TAZOBACTAM: SIGNIFICANT CHANGE UP
-  TOBRAMYCIN: SIGNIFICANT CHANGE UP
-  TRIMETHOPRIM/SULFAMETHOXAZOLE: SIGNIFICANT CHANGE UP
ANION GAP SERPL CALC-SCNC: 10 MMOL/L — SIGNIFICANT CHANGE UP (ref 5–17)
APTT BLD: 26.2 SEC — LOW (ref 27.5–37.4)
BLD GP AB SCN SERPL QL: NEGATIVE — SIGNIFICANT CHANGE UP
BUN SERPL-MCNC: 8 MG/DL — SIGNIFICANT CHANGE UP (ref 7–23)
CALCIUM SERPL-MCNC: 8.6 MG/DL — SIGNIFICANT CHANGE UP (ref 8.4–10.5)
CHLORIDE SERPL-SCNC: 105 MMOL/L — SIGNIFICANT CHANGE UP (ref 96–108)
CO2 SERPL-SCNC: 26 MMOL/L — SIGNIFICANT CHANGE UP (ref 22–31)
CREAT SERPL-MCNC: 0.51 MG/DL — SIGNIFICANT CHANGE UP (ref 0.5–1.3)
CULTURE RESULTS: SIGNIFICANT CHANGE UP
GLUCOSE SERPL-MCNC: 114 MG/DL — HIGH (ref 70–99)
HCT VFR BLD CALC: 21.9 % — LOW (ref 34.5–45)
HCT VFR BLD CALC: 23.3 % — LOW (ref 34.5–45)
HCT VFR BLD CALC: 25.7 % — LOW (ref 34.5–45)
HGB BLD-MCNC: 7.3 G/DL — LOW (ref 11.5–15.5)
HGB BLD-MCNC: 7.6 G/DL — LOW (ref 11.5–15.5)
HGB BLD-MCNC: 8.3 G/DL — LOW (ref 11.5–15.5)
INR BLD: 1.14 RATIO — SIGNIFICANT CHANGE UP (ref 0.88–1.16)
LACTATE SERPL-SCNC: 1.1 MMOL/L — SIGNIFICANT CHANGE UP (ref 0.7–2)
MCHC RBC-ENTMCNC: 28.3 PG — SIGNIFICANT CHANGE UP (ref 27–34)
MCHC RBC-ENTMCNC: 28.8 PG — SIGNIFICANT CHANGE UP (ref 27–34)
MCHC RBC-ENTMCNC: 29.1 PG — SIGNIFICANT CHANGE UP (ref 27–34)
MCHC RBC-ENTMCNC: 32.1 GM/DL — SIGNIFICANT CHANGE UP (ref 32–36)
MCHC RBC-ENTMCNC: 32.6 GM/DL — SIGNIFICANT CHANGE UP (ref 32–36)
MCHC RBC-ENTMCNC: 33.1 GM/DL — SIGNIFICANT CHANGE UP (ref 32–36)
MCV RBC AUTO: 87.9 FL — SIGNIFICANT CHANGE UP (ref 80–100)
MCV RBC AUTO: 88 FL — SIGNIFICANT CHANGE UP (ref 80–100)
MCV RBC AUTO: 88.3 FL — SIGNIFICANT CHANGE UP (ref 80–100)
METHOD TYPE: SIGNIFICANT CHANGE UP
ORGANISM # SPEC MICROSCOPIC CNT: SIGNIFICANT CHANGE UP
ORGANISM # SPEC MICROSCOPIC CNT: SIGNIFICANT CHANGE UP
PLATELET # BLD AUTO: 178 K/UL — SIGNIFICANT CHANGE UP (ref 150–400)
PLATELET # BLD AUTO: 179 K/UL — SIGNIFICANT CHANGE UP (ref 150–400)
PLATELET # BLD AUTO: 234 K/UL — SIGNIFICANT CHANGE UP (ref 150–400)
POTASSIUM SERPL-MCNC: 4.4 MMOL/L — SIGNIFICANT CHANGE UP (ref 3.5–5.3)
POTASSIUM SERPL-SCNC: 4.4 MMOL/L — SIGNIFICANT CHANGE UP (ref 3.5–5.3)
PROTHROM AB SERPL-ACNC: 12.4 SEC — SIGNIFICANT CHANGE UP (ref 9.8–12.7)
RBC # BLD: 2.5 M/UL — LOW (ref 3.8–5.2)
RBC # BLD: 2.64 M/UL — LOW (ref 3.8–5.2)
RBC # BLD: 2.92 M/UL — LOW (ref 3.8–5.2)
RBC # FLD: 13.4 % — SIGNIFICANT CHANGE UP (ref 10.3–14.5)
RBC # FLD: 13.9 % — SIGNIFICANT CHANGE UP (ref 10.3–14.5)
RBC # FLD: 14 % — SIGNIFICANT CHANGE UP (ref 10.3–14.5)
RH IG SCN BLD-IMP: POSITIVE — SIGNIFICANT CHANGE UP
SODIUM SERPL-SCNC: 141 MMOL/L — SIGNIFICANT CHANGE UP (ref 135–145)
SPECIMEN SOURCE: SIGNIFICANT CHANGE UP
WBC # BLD: 3.1 K/UL — LOW (ref 3.8–10.5)
WBC # BLD: 3.1 K/UL — LOW (ref 3.8–10.5)
WBC # BLD: 3.5 K/UL — LOW (ref 3.8–10.5)
WBC # FLD AUTO: 3.1 K/UL — LOW (ref 3.8–10.5)
WBC # FLD AUTO: 3.1 K/UL — LOW (ref 3.8–10.5)
WBC # FLD AUTO: 3.5 K/UL — LOW (ref 3.8–10.5)

## 2018-08-12 RX ORDER — ASCORBIC ACID 60 MG
500 TABLET,CHEWABLE ORAL DAILY
Qty: 0 | Refills: 0 | Status: DISCONTINUED | OUTPATIENT
Start: 2018-08-12 | End: 2018-08-14

## 2018-08-12 RX ORDER — DOCUSATE SODIUM 100 MG
100 CAPSULE ORAL THREE TIMES A DAY
Qty: 0 | Refills: 0 | Status: DISCONTINUED | OUTPATIENT
Start: 2018-08-12 | End: 2018-08-14

## 2018-08-12 RX ORDER — DOCUSATE SODIUM 100 MG
100 CAPSULE ORAL THREE TIMES A DAY
Qty: 0 | Refills: 0 | Status: DISCONTINUED | OUTPATIENT
Start: 2018-08-12 | End: 2018-08-12

## 2018-08-12 RX ORDER — FERROUS SULFATE 325(65) MG
325 TABLET ORAL THREE TIMES A DAY
Qty: 0 | Refills: 0 | Status: DISCONTINUED | OUTPATIENT
Start: 2018-08-12 | End: 2018-08-14

## 2018-08-12 RX ADMIN — BENZOCAINE AND MENTHOL 1 LOZENGE: 5; 1 LIQUID ORAL at 08:19

## 2018-08-12 RX ADMIN — Medication 1 APPLICATION(S): at 21:06

## 2018-08-12 RX ADMIN — Medication 100 MILLIGRAM(S): at 21:07

## 2018-08-12 RX ADMIN — Medication 200 MILLIGRAM(S): at 04:17

## 2018-08-12 RX ADMIN — Medication 325 MILLIGRAM(S): at 21:06

## 2018-08-12 RX ADMIN — Medication 1 TABLET(S): at 13:58

## 2018-08-12 RX ADMIN — Medication 1 APPLICATION(S): at 05:41

## 2018-08-12 RX ADMIN — PIPERACILLIN AND TAZOBACTAM 25 GRAM(S): 4; .5 INJECTION, POWDER, LYOPHILIZED, FOR SOLUTION INTRAVENOUS at 13:57

## 2018-08-12 RX ADMIN — HEPARIN SODIUM 5000 UNIT(S): 5000 INJECTION INTRAVENOUS; SUBCUTANEOUS at 14:03

## 2018-08-12 RX ADMIN — BENZOCAINE AND MENTHOL 1 LOZENGE: 5; 1 LIQUID ORAL at 05:45

## 2018-08-12 RX ADMIN — PIPERACILLIN AND TAZOBACTAM 25 GRAM(S): 4; .5 INJECTION, POWDER, LYOPHILIZED, FOR SOLUTION INTRAVENOUS at 21:05

## 2018-08-12 RX ADMIN — Medication 325 MILLIGRAM(S): at 13:58

## 2018-08-12 RX ADMIN — Medication 1 APPLICATION(S): at 14:03

## 2018-08-12 RX ADMIN — Medication 100 MILLIGRAM(S): at 13:58

## 2018-08-12 RX ADMIN — PIPERACILLIN AND TAZOBACTAM 25 GRAM(S): 4; .5 INJECTION, POWDER, LYOPHILIZED, FOR SOLUTION INTRAVENOUS at 05:41

## 2018-08-12 RX ADMIN — Medication 500 MILLIGRAM(S): at 21:05

## 2018-08-12 RX ADMIN — HEPARIN SODIUM 5000 UNIT(S): 5000 INJECTION INTRAVENOUS; SUBCUTANEOUS at 02:04

## 2018-08-12 NOTE — PROGRESS NOTE ADULT - ASSESSMENT
38yo  POD#1 s/p DVC for retained products of conception c/b PPH (EBL 1L) requiring uterotonics and placement of Bakri. Bakri removed POD#1, Pt remains afebrile, hemodynamically stable with minimal bleeding 36yo  POD#1 s/p DVC for retained products of conception c/b PPH (EBL 1L) requiring uterotonics, tranexamic acid and placement of Bakri. Bakri removed POD#1, Pt remains afebrile, hemodynamically stable with minimal bleeding

## 2018-08-12 NOTE — PROGRESS NOTE ADULT - ATTENDING COMMENTS
I saw and examined Ms. Connors and reviewed her case and plan of care and my recommendations extensively with her and her father.   Agree with edited resident note above.     She reports some weakness and a mild headache but is overall improved since admission. She reports decreased appetite. She reports minimal vaginal spotting and denies fevers or chills. She has throat pain and mild cough after intubation. She and her family are worried about her kidney function, her weakness, if she has sepsis, and that her  has a rash and the possibility that she could have given the  an infection. They are requesting copies of her medical records for second opinion.     Vital signs are normal and labs are stable.   T(C): 37 (12 Aug 2018 13:23), Max: 37.3 (11 Aug 2018 20:54)  T(F): 98.6 (12 Aug 2018 13:23), Max: 99.2 (11 Aug 2018 20:54)  HR: 59 (12 Aug 2018 13:23) (59 - 83)  BP: 101/63 (12 Aug 2018 13:23) (92/58 - 101/63)  BP(mean): 60 (11 Aug 2018 16:30) (60 - 60)  RR: 18 (12 Aug 2018 13:23) (18 - 18)  SpO2: 98% (12 Aug 2018 13:23) (97% - 98%)    On exam, her abdomen in minimally tender without rebound or guarding.                         7.6    3.1   )-----------( 179      ( 12 Aug 2018 10:28 )             23.3   Basic Metabolic Panel - STAT (18 @ 13:19)    Sodium, Serum: 141 mmol/L    Potassium, Serum: 4.4 mmol/L    Chloride, Serum: 105 mmol/L    Carbon Dioxide, Serum: 26 mmol/L    Anion Gap, Serum: 10 mmol/L    Blood Urea Nitrogen, Serum: 8 mg/dL    Creatinine, Serum: 0.51 mg/dL    Glucose, Serum: 114 mg/dL    Calcium, Total Serum: 8.6 mg/dL    Repeat lactate 1.1 and BMP is normal.    I explained my assessment and plan of care. I encouraged her to rest and walk when she can, continue incentive spirometry and eat and drink as much and whatever she desires. Will continue antibiotic therapy, started iron and colace and vitamin c for anemia. I explained that I believe her status to be completely stable now and that she will take time to feel back to normal. I explained that she should alert us immediately to any new symptoms and that we are closely monitoring her for any signs or symptoms of worsening infection or hemorrhage. I discussed her normal renal function panel and that she does not meet criteria for SIRS or sepsis as she only meets one of the required two criteria - low WBC count. We will continue to trend this until resolution along with frequent monitoring of her vital signs.     Her father was directed to admitting to request her medical records. Discussed with nursing management.    The  is at home with her mother. I explained that I am not a pediatrician, but if they have concerns or the infant is febrile, the closest ED with [facility] is located at [facility].     Kina Suh MD I saw and examined Ms. Connors and reviewed her case and plan of care and my recommendations extensively with her and her father.   Agree with edited resident note above.     She reports some weakness and a mild headache but is overall improved since admission. She reports decreased appetite. She reports minimal vaginal spotting and denies fevers or chills. She has throat pain and mild cough after intubation. She and her family are worried about her kidney function, her weakness, if she has sepsis, and that her  has a rash and the possibility that she could have given the  an infection. They are requesting copies of her medical records for second opinion.     Vital signs are normal and labs are stable.   T(C): 37 (12 Aug 2018 13:23), Max: 37.3 (11 Aug 2018 20:54)  T(F): 98.6 (12 Aug 2018 13:23), Max: 99.2 (11 Aug 2018 20:54)  HR: 59 (12 Aug 2018 13:23) (59 - 83)  BP: 101/63 (12 Aug 2018 13:23) (92/58 - 101/63)  BP(mean): 60 (11 Aug 2018 16:30) (60 - 60)  RR: 18 (12 Aug 2018 13:23) (18 - 18)  SpO2: 98% (12 Aug 2018 13:23) (97% - 98%)    On exam, her abdomen in minimally tender without rebound or guarding.                         7.6    3.1   )-----------( 179      ( 12 Aug 2018 10:28 )             23.3   Basic Metabolic Panel - STAT (18 @ 13:19)    Sodium, Serum: 141 mmol/L    Potassium, Serum: 4.4 mmol/L    Chloride, Serum: 105 mmol/L    Carbon Dioxide, Serum: 26 mmol/L    Anion Gap, Serum: 10 mmol/L    Blood Urea Nitrogen, Serum: 8 mg/dL    Creatinine, Serum: 0.51 mg/dL    Glucose, Serum: 114 mg/dL    Calcium, Total Serum: 8.6 mg/dL    Repeat lactate 1.1 and BMP is normal.    I explained my assessment and plan of care. I encouraged her to rest and walk when she can, continue incentive spirometry and eat and drink as much and whatever she desires. Will continue antibiotic therapy, started iron and colace and vitamin c for anemia. I explained that I believe her status to be completely stable now and that she will take time to feel back to normal. I explained that she should alert us immediately to any new symptoms and that we are closely monitoring her for any signs or symptoms of worsening infection or hemorrhage. I discussed her normal renal function panel and that she does not meet criteria for SIRS or sepsis as she only meets one of the required two criteria - low WBC count. We will continue to trend this until resolution along with frequent monitoring of her vital signs.     Her father was directed to admitting to request her medical records. Discussed with nursing management.    The  is at home with her mother. I explained that endometritis is not contagious to newborns (even via breast milk) but that I am not a pediatrician, and if they have concerns or the infant is febrile, the closest ED with [facility] is located at [facility] and they should bring in the child for evaluation.     Kina Suh MD

## 2018-08-12 NOTE — PROGRESS NOTE ADULT - PROBLEM SELECTOR PLAN 1
CV: Hemodynamically stable.  Pulm: Saturating well on room air. ventolin PRN wheezing/SOB  GI: tolerating regular diet  : voiding, no issues  Heme: Continue HSQ and SCDs for DVT ppx  ID: +fundal tenderness, no leukocytosis, pt afebrile. Day 3 of zosyn, gentamicin for endometritis. Consider transitioning to PO abx today  Analgesia: continue current oral regimen  Dispo: continue routine post op care    S Ludwin, R3

## 2018-08-12 NOTE — PROGRESS NOTE ADULT - SUBJECTIVE AND OBJECTIVE BOX
OB Progress Note  POD#2 s/p DVC for retained products, c/b PPH EBL 1L requiring Bakri balloon and uterotonics    Subjective:   Pt seen and examined at bedside. She feels like she is improving. She denies fevers, chills, SOB, chest pain, palpitations, lightheadedness or dizziness. Patient ambulating. Passing flatus. Tolerating regular diet.     Objective:  T(F): 98.3 (18 @ 04:29), Max: 99.2 (18 @ 20:54)  HR: 65 (18 @ 04:29) (60 - 83)  BP: 95/60 (18 @ 04:29) (92/58 - 98/58)  RR: 18 (18 @ 04:29) (17 - 18)  SpO2: 97% (18 @ 04:29) (96% - 98%)  I&O's Summary    11 Aug 2018 07:01  -  12 Aug 2018 07:00  --------------------------------------------------------  IN: 4760 mL / OUT: 2865 mL / NET: 1895 mL      MEDICATIONS  (STANDING):  acetaminophen  IVPB. 1000 milliGRAM(s) IV Intermittent once  gentamicin   IVPB 320 milliGRAM(s) IV Intermittent every 24 hours  heparin  Injectable 5000 Unit(s) SubCutaneous every 12 hours  lactated ringers. 1000 milliLiter(s) (150 mL/Hr) IV Continuous <Continuous>  lanolin Ointment 1 Application(s) Topical three times a day  piperacillin/tazobactam IVPB. 3.375 Gram(s) IV Intermittent every 8 hours    MEDICATIONS  (PRN):  ALBUTerol    90 MICROgram(s) HFA Inhaler 2 Puff(s) Inhalation every 6 hours PRN Shortness of Breath and/or Wheezing  benzocaine 15 mG/menthol 3.6 mG Lozenge 1 Lozenge Oral every 2 hours PRN Sore Throat  oxyCODONE    IR 5 milliGRAM(s) Oral every 3 hours PRN Severe Pain (7 - 10)      Physical Exam:  Constitutional: NAD, A+O x3  CV: RRR  Lungs: clear to auscultation bilaterally  Abdomen: soft, mildly distended, mild fundal tenderness, no rebound or guarding  : light bleeding on pad  Extremities: no lower extremity edema or calf tenderness bilaterally    LABS:        142    |  110<H>  |  8      ----------------------------<  129<H>  4.3     |  22     |  0.65     Ca    8.2<L>      11 Aug 2018 06:59          PT/INR - ( 12 Aug 2018 06:29 )   PT: 12.4 sec;   INR: 1.14 ratio         PTT - ( 12 Aug 2018 06:29 )  PTT:26.2 sec  Urinalysis Basic - ( 10 Aug 2018 08:52 )    Color: PL Yellow / Appearance: SL Turbid / S.007 / pH: x  Gluc: x / Ketone: Negative  / Bili: Negative / Urobili: Negative   Blood: x / Protein: Trace / Nitrite: Negative   Leuk Esterase: Large / RBC: 5-10 /HPF / WBC 25-50 /HPF   Sq Epi: x / Non Sq Epi: Moderate /HPF / Bacteria: x        CV: Hemodynamically stable  Pulm: Saturating well on RA. Increase incentive spirometry.  GI: Advance diet as tolerated  : Lag[place] in place. Adequate UOP  Heme: Continue HSQ/Lovenox/Venodynes for DVT ppx. Increase OOB.    Neuro: PCA for pain control. // Continue oral meds for pain control. OB Progress Note  POD#2 s/p DVC for retained products, c/b PPH EBL 1L requiring Bakri balloon and uterotonics    Subjective:   Pt seen and examined at bedside. She feels like she is improving. She denies fevers, chills, SOB, chest pain, palpitations, lightheadedness or dizziness. Patient ambulating. Passing flatus. Tolerating regular diet.     Objective:  T(F): 98.3 (18 @ 04:29), Max: 99.2 (18 @ 20:54)  HR: 65 (18 @ 04:29) (60 - 83)  BP: 95/60 (18 @ 04:29) (92/58 - 98/58)  RR: 18 (18 @ 04:29) (17 - 18)  SpO2: 97% (18 @ 04:29) (96% - 98%)  I&O's Summary    11 Aug 2018 07:01  -  12 Aug 2018 07:00  --------------------------------------------------------  IN: 4760 mL / OUT: 2865 mL / NET: 1895 mL      MEDICATIONS  (STANDING):  acetaminophen  IVPB. 1000 milliGRAM(s) IV Intermittent once  gentamicin   IVPB 320 milliGRAM(s) IV Intermittent every 24 hours  heparin  Injectable 5000 Unit(s) SubCutaneous every 12 hours  lactated ringers. 1000 milliLiter(s) (150 mL/Hr) IV Continuous <Continuous>  lanolin Ointment 1 Application(s) Topical three times a day  piperacillin/tazobactam IVPB. 3.375 Gram(s) IV Intermittent every 8 hours    MEDICATIONS  (PRN):  ALBUTerol    90 MICROgram(s) HFA Inhaler 2 Puff(s) Inhalation every 6 hours PRN Shortness of Breath and/or Wheezing  benzocaine 15 mG/menthol 3.6 mG Lozenge 1 Lozenge Oral every 2 hours PRN Sore Throat  oxyCODONE    IR 5 milliGRAM(s) Oral every 3 hours PRN Severe Pain (7 - 10)      Physical Exam:  Constitutional: NAD, A+O x3  CV: RRR  Lungs: clear to auscultation bilaterally  Abdomen: soft, mildly distended, mild fundal tenderness, no rebound or guarding  : light bleeding on pad  Extremities: no lower extremity edema or calf tenderness bilaterally    LABS:        142    |  110<H>  |  8      ----------------------------<  129<H>  4.3     |  22     |  0.65     Ca    8.2<L>      11 Aug 2018 06:59          PT/INR - ( 12 Aug 2018 06:29 )   PT: 12.4 sec;   INR: 1.14 ratio         PTT - ( 12 Aug 2018 06:29 )  PTT:26.2 sec  Urinalysis Basic - ( 10 Aug 2018 08:52 )    Color: PL Yellow / Appearance: SL Turbid / S.007 / pH: x  Gluc: x / Ketone: Negative  / Bili: Negative / Urobili: Negative   Blood: x / Protein: Trace / Nitrite: Negative   Leuk Esterase: Large / RBC: 5-10 /HPF / WBC 25-50 /HPF   Sq Epi: x / Non Sq Epi: Moderate /HPF / Bacteria: x        CV: Hemodynamically stable  Pulm: Saturating well on RA. Increase incentive spirometry.  GI: Advance diet as tolerated  : Voiding spontaneously, adequate UOP  Heme: Continue HSQ/Venodynes for DVT ppx. Increase OOB.    Neuro: PCA for pain control. // Continue oral meds for pain control.

## 2018-08-13 LAB
BASOPHILS # BLD AUTO: 0 K/UL — SIGNIFICANT CHANGE UP (ref 0–0.2)
BASOPHILS NFR BLD AUTO: 0 % — SIGNIFICANT CHANGE UP (ref 0–2)
EOSINOPHIL # BLD AUTO: 0.1 K/UL — SIGNIFICANT CHANGE UP (ref 0–0.5)
EOSINOPHIL NFR BLD AUTO: 2.7 % — SIGNIFICANT CHANGE UP (ref 0–6)
HCT VFR BLD CALC: 24.4 % — LOW (ref 34.5–45)
HGB BLD-MCNC: 7.9 G/DL — LOW (ref 11.5–15.5)
LYMPHOCYTES # BLD AUTO: 0.9 K/UL — LOW (ref 1–3.3)
LYMPHOCYTES # BLD AUTO: 34.2 % — SIGNIFICANT CHANGE UP (ref 13–44)
MCHC RBC-ENTMCNC: 28.6 PG — SIGNIFICANT CHANGE UP (ref 27–34)
MCHC RBC-ENTMCNC: 32.4 GM/DL — SIGNIFICANT CHANGE UP (ref 32–36)
MCV RBC AUTO: 88.1 FL — SIGNIFICANT CHANGE UP (ref 80–100)
MONOCYTES # BLD AUTO: 0.2 K/UL — SIGNIFICANT CHANGE UP (ref 0–0.9)
MONOCYTES NFR BLD AUTO: 6.6 % — SIGNIFICANT CHANGE UP (ref 2–14)
NEUTROPHILS # BLD AUTO: 1.6 K/UL — LOW (ref 1.8–7.4)
NEUTROPHILS NFR BLD AUTO: 56.5 % — SIGNIFICANT CHANGE UP (ref 43–77)
PLAT MORPH BLD: NORMAL — SIGNIFICANT CHANGE UP
PLATELET # BLD AUTO: 201 K/UL — SIGNIFICANT CHANGE UP (ref 150–400)
RBC # BLD: 2.77 M/UL — LOW (ref 3.8–5.2)
RBC # FLD: 14.2 % — SIGNIFICANT CHANGE UP (ref 10.3–14.5)
RBC BLD AUTO: SIGNIFICANT CHANGE UP
WBC # BLD: 2.8 K/UL — LOW (ref 3.8–10.5)
WBC # FLD AUTO: 2.8 K/UL — LOW (ref 3.8–10.5)

## 2018-08-13 RX ADMIN — Medication 200 MILLIGRAM(S): at 05:36

## 2018-08-13 RX ADMIN — Medication 1 APPLICATION(S): at 22:31

## 2018-08-13 RX ADMIN — Medication 1 TABLET(S): at 22:31

## 2018-08-13 RX ADMIN — Medication 325 MILLIGRAM(S): at 22:31

## 2018-08-13 RX ADMIN — HEPARIN SODIUM 5000 UNIT(S): 5000 INJECTION INTRAVENOUS; SUBCUTANEOUS at 01:14

## 2018-08-13 RX ADMIN — Medication 325 MILLIGRAM(S): at 13:54

## 2018-08-13 RX ADMIN — HEPARIN SODIUM 5000 UNIT(S): 5000 INJECTION INTRAVENOUS; SUBCUTANEOUS at 13:54

## 2018-08-13 RX ADMIN — PIPERACILLIN AND TAZOBACTAM 25 GRAM(S): 4; .5 INJECTION, POWDER, LYOPHILIZED, FOR SOLUTION INTRAVENOUS at 06:48

## 2018-08-13 RX ADMIN — PIPERACILLIN AND TAZOBACTAM 25 GRAM(S): 4; .5 INJECTION, POWDER, LYOPHILIZED, FOR SOLUTION INTRAVENOUS at 13:54

## 2018-08-13 RX ADMIN — Medication 100 MILLIGRAM(S): at 13:54

## 2018-08-13 RX ADMIN — Medication 500 MILLIGRAM(S): at 13:54

## 2018-08-13 RX ADMIN — Medication 100 MILLIGRAM(S): at 22:31

## 2018-08-13 RX ADMIN — Medication 1 APPLICATION(S): at 05:36

## 2018-08-13 RX ADMIN — Medication 325 MILLIGRAM(S): at 05:38

## 2018-08-13 RX ADMIN — Medication 100 MILLIGRAM(S): at 05:38

## 2018-08-13 RX ADMIN — Medication 1 APPLICATION(S): at 13:54

## 2018-08-13 RX ADMIN — Medication 1 TABLET(S): at 13:55

## 2018-08-13 NOTE — CHART NOTE - NSCHARTNOTEFT_GEN_A_CORE
Spoke with patient at bedside with patient's sister on phone. Discussed hospital course at length, including current picture and discharge goals. Patient reports feeling lightheaded when she got out of bed this morning; otherwise she has been well today. Ambulating without difficulty, tolerating regular diet, and voiding freely.  She remains afebrile with stable BPs without tachycardia. Blood cultures negative to date.    - dc IV antibiotics tonight, start Augmentin 875mg bid  - If remains afebrile, anticipate dc planning tomorrow    S Ludwin, R3

## 2018-08-13 NOTE — PROGRESS NOTE ADULT - ATTENDING COMMENTS
Patient seen and examined by me.  Agree with above resident note. Patient states she feels more tired than yesterday but looks well overall. Her only other physical complaint is mild discomfort in an area of the LLQ but not the uterus - more likely gas in the bowel based upon the exam - no guarding, no rebound. (+) flatus. H/H 8.6/29.1 on 8/11. 8/12 H/H 7.3/21.9, 7.6/23.3, 8.3/25.7 with WBC of 3.5. Afebrile with Tmax 37.1. Discussed waiting for blood cultures to evaluate adequacy of IV antibiotics. This was all discussed with the patient.

## 2018-08-13 NOTE — PROGRESS NOTE ADULT - ASSESSMENT
36yo  POD#2 s/p DVC for retained POC c/b PPH (EBL 1L) requiring uterotonics, tranexamic acid and Bakri. Now POD 1 d/p bakri removal, Pt remains afebrile, hemodynamically stable with minimal bleeding

## 2018-08-13 NOTE — PROGRESS NOTE ADULT - PROBLEM SELECTOR PLAN 1
Neuro: Continue po pain meds PRN  CV: Hemodynamically stable  Pulm: Saturating well on room air, encourage oob/amb. albuterol PRN wheezing  GI: Advance as tolerated  : UOP adequate, voiding freely  Heme: SCDs for DVT ppx  ID: on day 3 of zosyn and gentamicin. wbc 3.5  Dispo: Continue routine post-op care    Lory Wheeler, PGY-3

## 2018-08-13 NOTE — PROGRESS NOTE ADULT - SUBJECTIVE AND OBJECTIVE BOX
POD# 3 PPD 18  HD# 4    Patient seen and examined at bedside. No acute overnight events. No acute complaints, pain well controlled. Patient is ambulating. Voiding spontaneously and tolerating regular diet. Denies CP, SOB, N/V, fevers, and chills.    Vital Signs Last 24 Hours  T(C): 36.9 (08-13-18 @ 00:25), Max: 37.1 (08-12-18 @ 21:34)  HR: 69 (08-13-18 @ 00:25) (59 - 84)  BP: 96/59 (08-13-18 @ 00:25) (95/50 - 110/67)  RR: 18 (08-13-18 @ 00:25) (18 - 18)  SpO2: 98% (08-13-18 @ 00:25) (98% - 100%)    I&O's Summary    11 Aug 2018 07:01  -  12 Aug 2018 07:00  --------------------------------------------------------  IN: 4760 mL / OUT: 2865 mL / NET: 1895 mL    12 Aug 2018 07:01  -  13 Aug 2018 04:41  --------------------------------------------------------  IN: 900 mL / OUT: 2500 mL / NET: -1600 mL        Physical Exam:  General: NAD  CV: RRR  Lungs: CTAB  Abdomen: Soft, non-tender, non-distended, +BS  Ext: No pain or swelling    Labs:                        8.3    3.5   )-----------( 234      ( 12 Aug 2018 20:08 )             25.7   baso x      eos x      imm gran x      lymph x      mono x      poly x                            7.6    3.1   )-----------( 179      ( 12 Aug 2018 10:28 )             23.3   baso x      eos x      imm gran x      lymph x      mono x      poly x                            7.3    3.1   )-----------( 178      ( 12 Aug 2018 06:29 )             21.9   baso x      eos x      imm gran x      lymph x      mono x      poly x                            8.6    6.2   )-----------( 183      ( 11 Aug 2018 08:15 )             29.1   baso 0.1    eos 0.9    imm gran x      lymph 21.0   mono 6.1    poly 71.8                         9.5    9.0   )-----------( 188      ( 10 Aug 2018 18:28 )             29.6   baso 0.1    eos 0.7    imm gran x      lymph 10.0   mono 4.1    poly 85.0                         9.0    8.4   )-----------( 182      ( 10 Aug 2018 14:34 )             27.7   baso 0.2    eos 0.9    imm gran x      lymph 9.0    mono 4.4    poly 85.5       MEDICATIONS  (STANDING):  acetaminophen  IVPB. 1000 milliGRAM(s) IV Intermittent once  ascorbic acid 500 milliGRAM(s) Oral daily  docusate sodium 100 milliGRAM(s) Oral three times a day  ferrous    sulfate 325 milliGRAM(s) Oral three times a day  gentamicin   IVPB 320 milliGRAM(s) IV Intermittent every 24 hours  heparin  Injectable 5000 Unit(s) SubCutaneous every 12 hours  lanolin Ointment 1 Application(s) Topical three times a day  piperacillin/tazobactam IVPB. 3.375 Gram(s) IV Intermittent every 8 hours  prenatal multivitamin 1 Tablet(s) Oral daily    MEDICATIONS  (PRN):  ALBUTerol    90 MICROgram(s) HFA Inhaler 2 Puff(s) Inhalation every 6 hours PRN Shortness of Breath and/or Wheezing  benzocaine 15 mG/menthol 3.6 mG Lozenge 1 Lozenge Oral every 2 hours PRN Sore Throat  oxyCODONE    IR 5 milliGRAM(s) Oral every 3 hours PRN Severe Pain (7 - 10)

## 2018-08-14 ENCOUNTER — TRANSCRIPTION ENCOUNTER (OUTPATIENT)
Age: 38
End: 2018-08-14

## 2018-08-14 VITALS
SYSTOLIC BLOOD PRESSURE: 110 MMHG | RESPIRATION RATE: 16 BRPM | DIASTOLIC BLOOD PRESSURE: 60 MMHG | TEMPERATURE: 98 F | HEART RATE: 60 BPM | OXYGEN SATURATION: 98 %

## 2018-08-14 DIAGNOSIS — Z71.89 OTHER SPECIFIED COUNSELING: ICD-10-CM

## 2018-08-14 LAB
BASOPHILS # BLD AUTO: 0 K/UL — SIGNIFICANT CHANGE UP (ref 0–0.2)
BASOPHILS NFR BLD AUTO: 0.1 % — SIGNIFICANT CHANGE UP (ref 0–2)
EOSINOPHIL # BLD AUTO: 0.1 K/UL — SIGNIFICANT CHANGE UP (ref 0–0.5)
EOSINOPHIL NFR BLD AUTO: 4.6 % — SIGNIFICANT CHANGE UP (ref 0–6)
HCT VFR BLD CALC: 28.8 % — LOW (ref 34.5–45)
HGB BLD-MCNC: 9.6 G/DL — LOW (ref 11.5–15.5)
LYMPHOCYTES # BLD AUTO: 1.2 K/UL — SIGNIFICANT CHANGE UP (ref 1–3.3)
LYMPHOCYTES # BLD AUTO: 41 % — SIGNIFICANT CHANGE UP (ref 13–44)
MCHC RBC-ENTMCNC: 28.6 PG — SIGNIFICANT CHANGE UP (ref 27–34)
MCHC RBC-ENTMCNC: 33.2 GM/DL — SIGNIFICANT CHANGE UP (ref 32–36)
MCV RBC AUTO: 86.3 FL — SIGNIFICANT CHANGE UP (ref 80–100)
MONOCYTES # BLD AUTO: 0.2 K/UL — SIGNIFICANT CHANGE UP (ref 0–0.9)
MONOCYTES NFR BLD AUTO: 6.7 % — SIGNIFICANT CHANGE UP (ref 2–14)
NEUTROPHILS # BLD AUTO: 1.4 K/UL — LOW (ref 1.8–7.4)
NEUTROPHILS NFR BLD AUTO: 47.5 % — SIGNIFICANT CHANGE UP (ref 43–77)
PLAT MORPH BLD: NORMAL — SIGNIFICANT CHANGE UP
PLATELET # BLD AUTO: 306 K/UL — SIGNIFICANT CHANGE UP (ref 150–400)
RBC # BLD: 3.33 M/UL — LOW (ref 3.8–5.2)
RBC # FLD: 13.6 % — SIGNIFICANT CHANGE UP (ref 10.3–14.5)
RBC BLD AUTO: SIGNIFICANT CHANGE UP
SURGICAL PATHOLOGY STUDY: SIGNIFICANT CHANGE UP
WBC # BLD: 2.9 K/UL — LOW (ref 3.8–10.5)
WBC # FLD AUTO: 2.9 K/UL — LOW (ref 3.8–10.5)

## 2018-08-14 PROCEDURE — 99285 EMERGENCY DEPT VISIT HI MDM: CPT | Mod: 25

## 2018-08-14 PROCEDURE — 87040 BLOOD CULTURE FOR BACTERIA: CPT

## 2018-08-14 PROCEDURE — 80053 COMPREHEN METABOLIC PANEL: CPT

## 2018-08-14 PROCEDURE — 96374 THER/PROPH/DIAG INJ IV PUSH: CPT

## 2018-08-14 PROCEDURE — 36430 TRANSFUSION BLD/BLD COMPNT: CPT

## 2018-08-14 PROCEDURE — 93975 VASCULAR STUDY: CPT

## 2018-08-14 PROCEDURE — 85730 THROMBOPLASTIN TIME PARTIAL: CPT

## 2018-08-14 PROCEDURE — 76830 TRANSVAGINAL US NON-OB: CPT

## 2018-08-14 PROCEDURE — 71045 X-RAY EXAM CHEST 1 VIEW: CPT

## 2018-08-14 PROCEDURE — 80048 BASIC METABOLIC PNL TOTAL CA: CPT

## 2018-08-14 PROCEDURE — 83605 ASSAY OF LACTIC ACID: CPT

## 2018-08-14 PROCEDURE — 87798 DETECT AGENT NOS DNA AMP: CPT

## 2018-08-14 PROCEDURE — 87086 URINE CULTURE/COLONY COUNT: CPT

## 2018-08-14 PROCEDURE — 96361 HYDRATE IV INFUSION ADD-ON: CPT

## 2018-08-14 PROCEDURE — 87186 SC STD MICRODIL/AGAR DIL: CPT

## 2018-08-14 PROCEDURE — C1889: CPT

## 2018-08-14 PROCEDURE — P9059: CPT

## 2018-08-14 PROCEDURE — 94640 AIRWAY INHALATION TREATMENT: CPT

## 2018-08-14 PROCEDURE — 87581 M.PNEUMON DNA AMP PROBE: CPT

## 2018-08-14 PROCEDURE — 86900 BLOOD TYPING SEROLOGIC ABO: CPT

## 2018-08-14 PROCEDURE — 86901 BLOOD TYPING SEROLOGIC RH(D): CPT

## 2018-08-14 PROCEDURE — 87633 RESP VIRUS 12-25 TARGETS: CPT

## 2018-08-14 PROCEDURE — 96375 TX/PRO/DX INJ NEW DRUG ADDON: CPT

## 2018-08-14 PROCEDURE — 85027 COMPLETE CBC AUTOMATED: CPT

## 2018-08-14 PROCEDURE — 93005 ELECTROCARDIOGRAM TRACING: CPT

## 2018-08-14 PROCEDURE — P9011: CPT

## 2018-08-14 PROCEDURE — 81001 URINALYSIS AUTO W/SCOPE: CPT

## 2018-08-14 PROCEDURE — 85610 PROTHROMBIN TIME: CPT

## 2018-08-14 PROCEDURE — 86850 RBC ANTIBODY SCREEN: CPT

## 2018-08-14 PROCEDURE — 86923 COMPATIBILITY TEST ELECTRIC: CPT

## 2018-08-14 PROCEDURE — 87486 CHLMYD PNEUM DNA AMP PROBE: CPT

## 2018-08-14 PROCEDURE — 85384 FIBRINOGEN ACTIVITY: CPT

## 2018-08-14 PROCEDURE — P9016: CPT

## 2018-08-14 RX ORDER — LACTOBACILLUS ACIDOPHILUS 100MM CELL
1 CAPSULE ORAL DAILY
Qty: 0 | Refills: 0 | Status: DISCONTINUED | OUTPATIENT
Start: 2018-08-14 | End: 2018-08-14

## 2018-08-14 RX ORDER — LACTOBACILLUS ACIDOPHILUS 100MM CELL
1 CAPSULE ORAL
Qty: 18 | Refills: 0 | OUTPATIENT
Start: 2018-08-14 | End: 2018-08-22

## 2018-08-14 RX ORDER — ONDANSETRON 8 MG/1
4 TABLET, FILM COATED ORAL EVERY 6 HOURS
Qty: 0 | Refills: 0 | Status: DISCONTINUED | OUTPATIENT
Start: 2018-08-14 | End: 2018-08-14

## 2018-08-14 RX ORDER — FAMOTIDINE 10 MG/ML
1 INJECTION INTRAVENOUS
Qty: 0 | Refills: 0 | COMMUNITY
Start: 2018-08-14

## 2018-08-14 RX ORDER — FAMOTIDINE 10 MG/ML
20 INJECTION INTRAVENOUS
Qty: 0 | Refills: 0 | Status: DISCONTINUED | OUTPATIENT
Start: 2018-08-14 | End: 2018-08-14

## 2018-08-14 RX ADMIN — Medication 325 MILLIGRAM(S): at 05:20

## 2018-08-14 RX ADMIN — ONDANSETRON 4 MILLIGRAM(S): 8 TABLET, FILM COATED ORAL at 05:51

## 2018-08-14 RX ADMIN — Medication 1 TABLET(S): at 12:40

## 2018-08-14 RX ADMIN — Medication 325 MILLIGRAM(S): at 12:40

## 2018-08-14 RX ADMIN — Medication 100 MILLIGRAM(S): at 12:40

## 2018-08-14 RX ADMIN — Medication 1 TABLET(S): at 10:24

## 2018-08-14 RX ADMIN — FAMOTIDINE 20 MILLIGRAM(S): 10 INJECTION INTRAVENOUS at 05:20

## 2018-08-14 RX ADMIN — Medication 1 APPLICATION(S): at 12:41

## 2018-08-14 RX ADMIN — Medication 500 MILLIGRAM(S): at 12:40

## 2018-08-14 RX ADMIN — Medication 1 APPLICATION(S): at 05:20

## 2018-08-14 RX ADMIN — Medication 100 MILLIGRAM(S): at 05:20

## 2018-08-14 RX ADMIN — HEPARIN SODIUM 5000 UNIT(S): 5000 INJECTION INTRAVENOUS; SUBCUTANEOUS at 00:56

## 2018-08-14 RX ADMIN — HEPARIN SODIUM 5000 UNIT(S): 5000 INJECTION INTRAVENOUS; SUBCUTANEOUS at 12:40

## 2018-08-14 NOTE — DISCHARGE NOTE ADULT - CARE PROVIDER_API CALL
Court Miller), Obstetrics and Gynecology  [address] [address]  Phone: [phone]  Fax: [phone]

## 2018-08-14 NOTE — PROGRESS NOTE ADULT - PROBLEM SELECTOR PLAN 1
Neuro: Continue po pain meds  CV: Hemodynamically stable  Pulm: Saturating well on room air, encourage oob/amb  GI: Advance as tolerated. zofran for nausea, lactobacillus for GI jonnie, pepcid for GERD  : UOP adequate, voiding freely  Heme: c/w HSQ and SCDs for DVT ppx  ID: s/p zosyn and gentamicin, Day 2 of Augmentin, Patient reports GI side effects with Augmentin, requesting alternative antibiotic. Counseled patient that will discuss alternative antibiotics with service attending, but patient may continue to have GI side effects with other oral antibiotics  Dispo: Continue routine post-op care, D/C planning if afebrile on PO antibiotics    Lory Wheeler, PGY-3

## 2018-08-14 NOTE — DISCHARGE NOTE ADULT - ADDITIONAL INSTRUCTIONS
Continue Augmentin for 11 days after discharge  Call clinic or return to ED for fever 100.4F, severe pain not relieved by Tylenol, or other acute concerns  Call clinic to schedule appointment in 1-2wks, [phone]

## 2018-08-14 NOTE — DISCHARGE NOTE ADULT - MEDICATION SUMMARY - MEDICATIONS TO TAKE
I will START or STAY ON the medications listed below when I get home from the hospital:    ibuprofen 600 mg oral tablet  -- 1 tab(s) by mouth every 6 hours, As Needed   -- Indication: For Pain as needed    Pepcid 20 mg oral tablet  -- 1 tab(s) by mouth 2 times a day  -- Indication: For Heartburn    FeroSul 325 mg (65 mg elemental iron) oral tablet  -- 1 tab(s) by mouth 2 times a day   -- Check with your doctor before becoming pregnant.  Do not chew, break, or crush.  May discolor urine or feces.    -- Indication: For anemia    Prenatal Multivitamins with Folic Acid 1 mg oral tablet  -- 1 tab(s) by mouth once a day  -- Indication: For vitamins    Colace 100 mg oral capsule  -- 1 cap(s) by mouth 2 times a day, As Needed   -- Medication should be taken with plenty of water.    -- Indication: For constipation    cyclobenzaprine 5 mg oral tablet  -- 1 tab(s) by mouth 3 times a day, As needed, Muscle Spasm  -- Indication: For Home med    amoxicillin-clavulanate 875 mg-125 mg oral tablet  -- 1 tab(s) by mouth every 12 hours  -- Indication: For Endometritis    lactobacillus acidophilus oral capsule  -- 1 tab(s) by mouth every 12 hours x 9 days   -- Indication: For Home med    norethindrone 0.35 mg oral tablet  -- 1 tab(s) by mouth once a day. Begin Sunday after the delivery.  -- Do not take this drug if you are pregnant.  It is very important that you take or use this exactly as directed.  Do not skip doses or discontinue unless directed by your doctor.    -- Indication: For contraception

## 2018-08-14 NOTE — DISCHARGE NOTE ADULT - CARE PLAN
Principal Discharge DX:	Endometritis  Goal:	treat infection, remain afebrile without sx of infection  Assessment and plan of treatment:	Continue Augmentin for 11 more days  Secondary Diagnosis:	Retained products of conception, postpartum

## 2018-08-14 NOTE — DISCHARGE NOTE ADULT - HOSPITAL COURSE
Patient presented with fever, tachycardia concerning for sepsis. Pelvic ultrasound showed retained products of conception and patient was taken to operating room for urgent DVC. Procedure was complicated by hemorrhage requiring uterotonics and 1unit pRBC transfusion. Patient recovered well and was transferred to floor where she was continued on IV antibiotics and monitored for signs of worsening infection. Pt remained afebrile and asymptomatic.   On HD#4, patient was transitioned to oral antibiotics (Augmentin) which she tolerated well.  Patient was deemed stable for discharge with instructions to follow up in clinic.

## 2018-08-14 NOTE — PROGRESS NOTE ADULT - ASSESSMENT
36yo  POD#4 s/p DVC for retained POC c/b PPH (EBL 1L) requiring uterotonics, tranexamic acid and Bakri. Now day 2 s/p bakri removal, Pt remains afebrile, hemodynamically stable with minimal bleeding

## 2018-08-14 NOTE — PROGRESS NOTE ADULT - SUBJECTIVE AND OBJECTIVE BOX
POD#   HD#    Patient seen and examined at bedside. No acute overnight events. No acute complaints, pain well controlled.  Patient is ambulating. She is passing flatus. Voiding spontaneously and tolerating regular diet. Denies CP, SOB, N/V, fevers, and chills.    Vital Signs Last 24 Hours  T(C): 36.6 (08-14-18 @ 05:18), Max: 36.8 (08-13-18 @ 09:22)  HR: 65 (08-14-18 @ 05:18) (65 - 86)  BP: 97/52 (08-14-18 @ 05:18) (92/57 - 107/65)  RR: 16 (08-14-18 @ 05:18) (16 - 18)  SpO2: 98% (08-14-18 @ 05:18) (97% - 99%)    I&O's Summary    12 Aug 2018 07:01  -  13 Aug 2018 07:00  --------------------------------------------------------  IN: 1000 mL / OUT: 2500 mL / NET: -1500 mL    13 Aug 2018 07:01  -  14 Aug 2018 06:11  --------------------------------------------------------  IN: 1421 mL / OUT: 2950 mL / NET: -1529 mL        Physical Exam:  General: NAD  CV: RRR  Lungs: CTAB  Abdomen: Soft, non-tender, non-distended, +BS  Incision: C/D/I  Ext: No pain or swelling    Labs:                        7.9    2.8   )-----------( 201      ( 13 Aug 2018 06:24 )             24.4   baso 0.0    eos 2.7    imm gran x      lymph 34.2   mono 6.6    poly 56.5                         8.3    3.5   )-----------( 234      ( 12 Aug 2018 20:08 )             25.7   baso x      eos x      imm gran x      lymph x      mono x      poly x                            7.6    3.1   )-----------( 179      ( 12 Aug 2018 10:28 )             23.3   baso x      eos x      imm gran x      lymph x      mono x      poly x                            7.3    3.1   )-----------( 178      ( 12 Aug 2018 06:29 )             21.9   baso x      eos x      imm gran x      lymph x      mono x      poly x                            8.6    6.2   )-----------( 183      ( 11 Aug 2018 08:15 )             29.1   baso 0.1    eos 0.9    imm gran x      lymph 21.0   mono 6.1    poly 71.8       MEDICATIONS  (STANDING):  acetaminophen  IVPB. 1000 milliGRAM(s) IV Intermittent once  amoxicillin  875 milliGRAM(s)/clavulanate 1 Tablet(s) Oral every 12 hours  ascorbic acid 500 milliGRAM(s) Oral daily  docusate sodium 100 milliGRAM(s) Oral three times a day  famotidine    Tablet 20 milliGRAM(s) Oral two times a day  ferrous    sulfate 325 milliGRAM(s) Oral three times a day  heparin  Injectable 5000 Unit(s) SubCutaneous every 12 hours  lactobacillus acidophilus 1 Tablet(s) Oral daily  lanolin Ointment 1 Application(s) Topical three times a day  prenatal multivitamin 1 Tablet(s) Oral daily    MEDICATIONS  (PRN):  ALBUTerol    90 MICROgram(s) HFA Inhaler 2 Puff(s) Inhalation every 6 hours PRN Shortness of Breath and/or Wheezing  benzocaine 15 mG/menthol 3.6 mG Lozenge 1 Lozenge Oral every 2 hours PRN Sore Throat  ondansetron    Tablet 4 milliGRAM(s) Oral every 6 hours PRN Nausea  oxyCODONE    IR 5 milliGRAM(s) Oral every 3 hours PRN Severe Pain (7 - 10)      A/P:     Neuro: Continue po pain meds  CV: Hemodynamically stable  Pulm: Saturating well on room air, encourage oob/amb  GI: Advance as tolerated  : UOP adequate, voiding freely  Heme: c/w HSQ and SCDs for DVT ppx  Dispo: Continue routine post-op care    Lory Wheeler MD POD# 4   PPD# 19  HD# 5    Patient seen and examined at bedside. No acute overnight events. Patient is reporting stomach upset, reflux, and nausea with Augmentin requesting an alternative antibiotic regimen. Patient is ambulating. She is passing flatus. Voiding spontaneously and tolerating regular diet. Denies CP, SOB, N/V, fevers, and chills.    Vital Signs Last 24 Hours  T(C): 36.6 (08-14-18 @ 05:18), Max: 36.8 (08-13-18 @ 09:22)  HR: 65 (08-14-18 @ 05:18) (65 - 86)  BP: 97/52 (08-14-18 @ 05:18) (92/57 - 107/65)  RR: 16 (08-14-18 @ 05:18) (16 - 18)  SpO2: 98% (08-14-18 @ 05:18) (97% - 99%)    I&O's Summary    12 Aug 2018 07:01  -  13 Aug 2018 07:00  --------------------------------------------------------  IN: 1000 mL / OUT: 2500 mL / NET: -1500 mL    13 Aug 2018 07:01  -  14 Aug 2018 06:11  --------------------------------------------------------  IN: 1421 mL / OUT: 2950 mL / NET: -1529 mL        Physical Exam:  General: NAD  CV: RRR  Lungs: CTAB  Abdomen: Soft, non-tender, non-distended  Ext: No pain or swelling    Labs:                        7.9    2.8   )-----------( 201      ( 13 Aug 2018 06:24 )             24.4   baso 0.0    eos 2.7    imm gran x      lymph 34.2   mono 6.6    poly 56.5                         8.3    3.5   )-----------( 234      ( 12 Aug 2018 20:08 )             25.7   baso x      eos x      imm gran x      lymph x      mono x      poly x                            7.6    3.1   )-----------( 179      ( 12 Aug 2018 10:28 )             23.3   baso x      eos x      imm gran x      lymph x      mono x      poly x                            7.3    3.1   )-----------( 178      ( 12 Aug 2018 06:29 )             21.9   baso x      eos x      imm gran x      lymph x      mono x      poly x                            8.6    6.2   )-----------( 183      ( 11 Aug 2018 08:15 )             29.1   baso 0.1    eos 0.9    imm gran x      lymph 21.0   mono 6.1    poly 71.8       MEDICATIONS  (STANDING):  acetaminophen  IVPB. 1000 milliGRAM(s) IV Intermittent once  amoxicillin  875 milliGRAM(s)/clavulanate 1 Tablet(s) Oral every 12 hours  ascorbic acid 500 milliGRAM(s) Oral daily  docusate sodium 100 milliGRAM(s) Oral three times a day  famotidine    Tablet 20 milliGRAM(s) Oral two times a day  ferrous    sulfate 325 milliGRAM(s) Oral three times a day  heparin  Injectable 5000 Unit(s) SubCutaneous every 12 hours  lactobacillus acidophilus 1 Tablet(s) Oral daily  lanolin Ointment 1 Application(s) Topical three times a day  prenatal multivitamin 1 Tablet(s) Oral daily    MEDICATIONS  (PRN):  ALBUTerol    90 MICROgram(s) HFA Inhaler 2 Puff(s) Inhalation every 6 hours PRN Shortness of Breath and/or Wheezing  benzocaine 15 mG/menthol 3.6 mG Lozenge 1 Lozenge Oral every 2 hours PRN Sore Throat  ondansetron    Tablet 4 milliGRAM(s) Oral every 6 hours PRN Nausea  oxyCODONE    IR 5 milliGRAM(s) Oral every 3 hours PRN Severe Pain (7 - 10)

## 2018-08-14 NOTE — PROGRESS NOTE ADULT - ATTENDING COMMENTS
Looking better today. Sitting up and eating. Biggest issue yesterday/ today is GI complaints with po Augmentin 1st dose. Recommended taking abx on full stomach to reduce GI complaints. Patient is willing to try this. Also, patient requested probiotic which had already been prescribed in house. To see how she does on the augmentin with a full stomach.

## 2018-08-14 NOTE — DISCHARGE NOTE ADULT - PATIENT PORTAL LINK FT
You can access the [facility] Patient Portal, offered by [facility], by registering with the following website: [email or web address]

## 2018-08-22 ENCOUNTER — APPOINTMENT (OUTPATIENT)
Dept: OBGYN | Facility: CLINIC | Age: 38
End: 2018-08-22
Payer: MEDICAID

## 2018-08-22 ENCOUNTER — RESULT CHARGE (OUTPATIENT)
Age: 38
End: 2018-08-22

## 2018-08-22 ENCOUNTER — LABORATORY RESULT (OUTPATIENT)
Age: 38
End: 2018-08-22

## 2018-08-22 ENCOUNTER — OUTPATIENT (OUTPATIENT)
Dept: OUTPATIENT SERVICES | Facility: HOSPITAL | Age: 38
LOS: 1 days | End: 2018-08-22
Payer: MEDICAID

## 2018-08-22 VITALS
SYSTOLIC BLOOD PRESSURE: 90 MMHG | WEIGHT: 152 LBS | DIASTOLIC BLOOD PRESSURE: 58 MMHG | BODY MASS INDEX: 25.33 KG/M2 | HEIGHT: 65 IN

## 2018-08-22 DIAGNOSIS — Z98.890 OTHER SPECIFIED POSTPROCEDURAL STATES: Chronic | ICD-10-CM

## 2018-08-22 DIAGNOSIS — Z01.419 ENCOUNTER FOR GYNECOLOGICAL EXAMINATION (GENERAL) (ROUTINE) W/OUT ABNORMAL FINDINGS: ICD-10-CM

## 2018-08-22 DIAGNOSIS — N76.0 ACUTE VAGINITIS: ICD-10-CM

## 2018-08-22 PROBLEM — Z00.00 ENCOUNTER FOR PREVENTIVE HEALTH EXAMINATION: Status: ACTIVE | Noted: 2018-08-22

## 2018-08-22 PROCEDURE — 81003 URINALYSIS AUTO W/O SCOPE: CPT

## 2018-08-22 PROCEDURE — G0463: CPT

## 2018-08-22 PROCEDURE — 87086 URINE CULTURE/COLONY COUNT: CPT

## 2018-08-22 PROCEDURE — 81003 URINALYSIS AUTO W/O SCOPE: CPT | Mod: NC,QW

## 2018-08-22 PROCEDURE — 99204 OFFICE O/P NEW MOD 45 MIN: CPT | Mod: NC

## 2018-08-23 ENCOUNTER — MESSAGE (OUTPATIENT)
Age: 38
End: 2018-08-23

## 2018-08-23 LAB
CULTURE RESULTS: NO GROWTH — SIGNIFICANT CHANGE UP
SPECIMEN SOURCE: SIGNIFICANT CHANGE UP

## 2018-08-23 RX ORDER — FLUCONAZOLE 150 MG/1
150 TABLET ORAL
Qty: 2 | Refills: 0 | Status: ACTIVE | COMMUNITY
Start: 2018-08-23 | End: 1900-01-01

## 2019-03-21 LAB
BILIRUB UR QL STRIP: NORMAL
CLARITY UR: NORMAL
COLLECTION METHOD: NORMAL
GLUCOSE UR-MCNC: NORMAL
HCG UR QL: 0.2 EU/DL
HGB UR QL STRIP.AUTO: NORMAL
KETONES UR-MCNC: NORMAL
LEUKOCYTE ESTERASE UR QL STRIP: NORMAL
NITRITE UR QL STRIP: NORMAL
PH UR STRIP: 5.5
PROT UR STRIP-MCNC: NORMAL
SP GR UR STRIP: 1.03

## 2019-11-16 NOTE — DISCHARGE NOTE ADULT - NSTOBACCOHOTLINE_GEN_A_CS
Physical Therapy Contact Note    PT consult received, active bedrest order with on going medical management and establishment of plan of care, will follow up once appropriate for PT evaluation;     Laura Montes, PT ,DPT Pager: [phone]   [facility] Smokers Quitline ([phone])

## 2020-12-16 PROBLEM — Z01.419 ENCOUNTER FOR ROUTINE GYNECOLOGICAL EXAMINATION: Status: RESOLVED | Noted: 2018-08-22 | Resolved: 2020-12-16

## 2021-06-15 NOTE — CHART NOTE - NSCHARTNOTESELECT_GEN_ALL_CORE

## 2024-02-09 NOTE — PRE-OP CHECKLIST - VERIFY SURGICAL SITE/SIDE WITH PATIENT
I haven't seen in 2022, would need to evaluate for permement handicap license.  Will sign for 1 year and can discuss in May.    jl   done
